# Patient Record
Sex: FEMALE | Race: WHITE | NOT HISPANIC OR LATINO | Employment: PART TIME | URBAN - METROPOLITAN AREA
[De-identification: names, ages, dates, MRNs, and addresses within clinical notes are randomized per-mention and may not be internally consistent; named-entity substitution may affect disease eponyms.]

---
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
## 2021-01-29 ENCOUNTER — ANNUAL EXAM (OUTPATIENT)
Dept: OBGYN CLINIC | Facility: CLINIC | Age: 21
End: 2021-01-29
Payer: COMMERCIAL

## 2021-01-29 VITALS
WEIGHT: 140.2 LBS | HEART RATE: 41 BPM | BODY MASS INDEX: 21.25 KG/M2 | DIASTOLIC BLOOD PRESSURE: 60 MMHG | SYSTOLIC BLOOD PRESSURE: 100 MMHG | HEIGHT: 68 IN | TEMPERATURE: 97.6 F

## 2021-01-29 DIAGNOSIS — Z01.419 ENCOUNTER FOR GYNECOLOGICAL EXAMINATION (GENERAL) (ROUTINE) WITHOUT ABNORMAL FINDINGS: Primary | ICD-10-CM

## 2021-01-29 DIAGNOSIS — N89.8 VAGINAL DISCHARGE: ICD-10-CM

## 2021-01-29 DIAGNOSIS — Z11.3 SCREENING EXAMINATION FOR STD (SEXUALLY TRANSMITTED DISEASE): ICD-10-CM

## 2021-01-29 LAB
BV WHIFF TEST VAG QL: NORMAL
CLUE CELLS SPEC QL WET PREP: NORMAL
PH SMN: 4 [PH]
T VAGINALIS VAG QL WET PREP: NORMAL
YEAST VAG QL WET PREP: NORMAL

## 2021-01-29 PROCEDURE — 87491 CHLMYD TRACH DNA AMP PROBE: CPT | Performed by: OBSTETRICS & GYNECOLOGY

## 2021-01-29 PROCEDURE — 87591 N.GONORRHOEAE DNA AMP PROB: CPT | Performed by: OBSTETRICS & GYNECOLOGY

## 2021-01-29 PROCEDURE — 87210 SMEAR WET MOUNT SALINE/INK: CPT | Performed by: OBSTETRICS & GYNECOLOGY

## 2021-01-29 PROCEDURE — 99395 PREV VISIT EST AGE 18-39: CPT | Performed by: OBSTETRICS & GYNECOLOGY

## 2021-01-29 NOTE — PATIENT INSTRUCTIONS
Breast Self Exam for Women   WHAT YOU NEED TO KNOW:   A BSE is a way to check your breasts for lumps and other changes  Regular BSEs can help you know how your breasts normally look and feel  Most breast lumps or changes are not cancer, but you should always have them checked by a healthcare provider  Your healthcare provider can also watch you do a BSE and can tell you if you are doing your BSE correctly  DISCHARGE INSTRUCTIONS:   Call your doctor if:   · You find any lumps or changes in your breasts  · You have breast pain or fluid coming from your nipples  · You have questions or concerns about your condition or care  Why you should do a BSE:  Breast cancer is the most common type of cancer in women  Even if you have mammograms, you may still want to do a BSE regularly  If you know how your breasts normally feel and look, it may help you know when to contact your healthcare provider  Mammograms can miss some cancers  You may find a lump during a BSE that did not show up on a mammogram   When you should do a BSE:  If you have periods, you may want to do your BSE 1 week after your period ends  This is the time when your breasts may be the least swollen, lumpy, or tender  You can do regular BSEs even if you are breastfeeding or have breast implants  How to do a BSE:       · Look at your breasts in a mirror  Look at the size and shape of each breast and nipple  Check for swelling, lumps, dimpling, scaly skin, or other skin changes  Look for nipple changes, such as a nipple that is painful or beginning to pull inward  Gently squeeze both nipples and check to see if fluid (that is not breast milk) comes out of them  If you find any of these or other breast changes, contact your healthcare provider  Check your breasts while you sit or  the following 3 positions:    ? Hang your arms down at your sides  ? Raise your hands and join them behind your head      ? Put firm pressure with your hands on your hips  Bend slightly forward while you look at your breasts in the mirror  · Lie down and feel your breasts  When you lie down, your breast tissue spreads out evenly over your chest  This makes it easier for you to feel for lumps and anything that may not be normal for your breasts  Do a BSE on one breast at a time  ? Place a small pillow or towel under your left shoulder  Put your left arm behind your head  ? Use the 3 middle fingers of your right hand  Use your fingertip pads, on the top of your fingers  Your fingertip pad is the most sensitive part of your finger  ? Use small circles to feel your breast tissue  Use your fingertip pads to make dime-sized, overlapping circles on your breast and armpits  Use light, medium, and firm pressure  First, press lightly  Second, press with medium pressure to feel a little deeper into the breast  Last, use firm pressure to feel deep within your breast     ? Examine your entire breast area  Examine the breast area from above the breast to below the breast where you feel only ribs  Make small circles with your fingertips, starting in the middle of your armpit  Make circles going up and down the breast area  Continue toward your breast and all the way across it  Examine the area from your armpit all the way over to the middle of your chest (breastbone)  Stop at the middle of your chest     ? Move the pillow or towel to your right shoulder, and put your right arm behind your head  Use the 3 fingertip pads of your left hand, and repeat the above steps to do a BSE on your right breast   What else you can do to check for breast problems or cancer:  Talk to your healthcare provider about mammograms  A mammogram is an x-ray of your breasts to screen for breast cancer or other problems  Your provider can tell you the benefits and risks of mammograms  The first mammogram is usually at age 39 or 48   Your provider may recommend you start at 36 or younger if your risk for breast cancer is high  Mammograms usually continue every 1 to 2 years until age 76  Follow up with your doctor as directed:  Write down your questions so you remember to ask them during your visits  © Copyright [address] Information is for End User's use only and may not be sold, redistributed or otherwise used for commercial purposes  All illustrations and images included in CareNotes® are the copyrighted property of A D A M , Inc  or [facility] Viviana Tran   The above information is an  only  It is not intended as medical advice for individual conditions or treatments  Talk to your doctor, nurse or pharmacist before following any medical regimen to see if it is safe and effective for you

## 2021-01-29 NOTE — PROGRESS NOTES
Assessment        Diagnoses and all orders for this visit:    Encounter for gynecological examination (general) (routine) without abnormal findings    Screening examination for STD (sexually transmitted disease)  -     Chlamydia/GC amplified DNA by PCR  -     Hepatitis B surface antigen; Future  -     Hepatitis C antibody; Future  -     Herpes I/II IgG Antibodies; Future  -     RPR; Future  -     HIV 1/2 Antigen/Antibody (4th Generation) w Reflex [facility]; Future    Vaginal discharge  -     POCT wet mount                  Plan      All questions answered  Blood tests: STD testing  Breast self exam technique reviewed and patient encouraged to perform self-exam monthly  Chlamydia specimen  Contraception: coitus interruptus  Discussed healthy lifestyle modifications  Educational material distributed  Follow up in 1 year  Follow up as needed  GC specimen  Wet prep  Wet prep is negative for infection  Await gonorrhea and chlamydia testing  Advised laboratory testing for STD screening we will call patient with results  She declines contraception  Follow-up in year  PAP due at age 25    [address] [place] [address] Estela is a 22 y o  female who presents for annual exam       Chief Complaint   Patient presents with    Gynecologic Exam     Yearly  Patient reports no concerns  She has been with same partner x 3 years  She has increased discharge, no itching or odor        Last Pap: n/a    HPV vaccine completed:yes - 3 doses  Current contraception: coitus interruptus  History of abnormal Pap smear: no  History of abnormal mammogram: no  Family history of uterine or ovarian cancer: no  Family history of breast cancer: no  Family history of colon cancer: no        Menstrual History:  OB History        0    Para   0    Term   0       0    AB   0    Living   0       SAB   0    TAB   0    Ectopic   0    Multiple   0    Live Births   0                Menarche age: 16  Patient's last menstrual period was 01/04/2021  Period Cycle (Days): 28  Period Duration (Days): 3-4  Period Pattern: Regular  Menstrual Flow: Light  Menstrual Control: Tampon, Thin pad      History reviewed  No pertinent past medical history  Past Surgical History:   Procedure Laterality Date    MOUTH SURGERY      release of upper lip frenulum     Family History   Problem Relation Age of Onset    Hypertension Mother     Mental illness Father     Bipolar disorder Father     Depression Father     Diabetes Father     Kidney cancer Maternal Grandmother     Stroke Maternal Grandmother     Cancer Maternal Grandfather     Cancer Paternal Grandmother     No Known Problems Paternal Grandfather     Hypothyroidism Half-Sister     No Known Problems Half-Sister        Social History     Tobacco Use    Smoking status: Never Smoker    Smokeless tobacco: Never Used   Substance Use Topics    Alcohol use: No    Drug use: No          Current Outpatient Medications:     multivitamin (THERAGRAN) TABS, Take 1 tablet by mouth daily, Disp: , Rfl:     No Known Allergies        Review of Systems   Constitutional: Negative  HENT: Negative  Eyes: Negative  Respiratory: Negative  Cardiovascular: Negative  Gastrointestinal: Negative  Endocrine: Negative  Genitourinary:        As noted in HPI   Musculoskeletal: Negative  Skin: Negative  Allergic/Immunologic: Negative  Neurological: Negative  Hematological: Negative  Psychiatric/Behavioral: Negative  /60 (BP Location: Right arm, Patient Position: Sitting, Cuff Size: Adult)   Pulse (!) 41   Temp 97 6 °F (36 4 °C) (Temporal)   Ht 5' 8" (1 727 m)   Wt 63 6 kg (140 lb 3 2 oz)   LMP 01/04/2021   BMI 21 32 kg/m²         Physical Exam  Constitutional:       Appearance: She is well-developed  Genitourinary:      Pelvic exam was performed with patient in the lithotomy position        Vulva, urethra, bladder, vagina, uterus and rectum normal       No vulval condylomata, lesion, tenderness, Bartholin's cyst or rash noted  No signs of labial injury  No posterior fourchette tenderness, injury, rash or lesion present  No inguinal adenopathy present in the right or left side  No lesions in the vagina  No vaginal discharge, tenderness, bleeding, atrophy or prolapse  No cervical motion tenderness, friability, lesion or polyp  Uterus is not enlarged or tender  No right or left adnexal mass present  Right adnexa not tender or full  Left adnexa not tender or full  Genitourinary Comments:      Rectum:      No external hemorrhoid  HENT:      Head: Normocephalic  Nose: Nose normal    Eyes:      Conjunctiva/sclera: Conjunctivae normal    Neck:      Musculoskeletal: Neck supple  No muscular tenderness  Thyroid: No thyromegaly  Cardiovascular:      Rate and Rhythm: Normal rate and regular rhythm  Heart sounds: Normal heart sounds  No murmur  Pulmonary:      Effort: Pulmonary effort is normal  No respiratory distress  Breath sounds: Normal breath sounds  No wheezing or rales  Chest:      Breasts:         Right: No mass, nipple discharge, skin change or tenderness  Left: No mass, nipple discharge, skin change or tenderness  Abdominal:      General: There is no distension  Palpations: Abdomen is soft  There is no mass  Tenderness: There is no abdominal tenderness  There is no guarding or rebound  Musculoskeletal:         General: No tenderness  Lymphadenopathy:      Cervical: No cervical adenopathy  Lower Body: No right inguinal adenopathy  No left inguinal adenopathy  Neurological:      Mental Status: She is alert and oriented to person, place, and time  Skin:     General: Skin is warm and dry     Psychiatric:         Mood and Affect: Mood normal          Behavior: Behavior normal

## 2021-01-30 LAB
C TRACH DNA SPEC QL NAA+PROBE: NEGATIVE
N GONORRHOEA DNA SPEC QL NAA+PROBE: NEGATIVE

## 2021-07-15 ENCOUNTER — OFFICE VISIT (OUTPATIENT)
Dept: URGENT CARE | Facility: CLINIC | Age: 21
End: 2021-07-15
Payer: COMMERCIAL

## 2021-07-15 VITALS
DIASTOLIC BLOOD PRESSURE: 52 MMHG | SYSTOLIC BLOOD PRESSURE: 108 MMHG | HEART RATE: 67 BPM | OXYGEN SATURATION: 98 % | RESPIRATION RATE: 18 BRPM | TEMPERATURE: 98.7 F

## 2021-07-15 DIAGNOSIS — J06.9 ACUTE RESPIRATORY DISEASE: Primary | ICD-10-CM

## 2021-07-15 PROCEDURE — 99203 OFFICE O/P NEW LOW 30 MIN: CPT | Performed by: PHYSICIAN ASSISTANT

## 2021-07-15 PROCEDURE — U0003 INFECTIOUS AGENT DETECTION BY NUCLEIC ACID (DNA OR RNA); SEVERE ACUTE RESPIRATORY SYNDROME CORONAVIRUS 2 (SARS-COV-2) (CORONAVIRUS DISEASE [COVID-19]), AMPLIFIED PROBE TECHNIQUE, MAKING USE OF HIGH THROUGHPUT TECHNOLOGIES AS DESCRIBED BY CMS-2020-01-R: HCPCS | Performed by: PHYSICIAN ASSISTANT

## 2021-07-15 PROCEDURE — U0005 INFEC AGEN DETEC AMPLI PROBE: HCPCS | Performed by: PHYSICIAN ASSISTANT

## 2021-07-15 NOTE — PATIENT INSTRUCTIONS
Flonase over the counter  Vitamin D3 2000 IU daily  Vitamin C 1g every 12 hours  Multivitamin daily  Fluids and rest  Nasal saline spray; Afrin if severe congestion (do not use for more than 3 days)  Over the counter decongestant  Tylenol/Ibuprofen for pain/fever  Salt water gargles and chloraseptic spray  Warm compresses over sinuses  Steam treatment (utilize proper safety precautions when in contact with hot water/steam)  Follow up with PCP in 3-5 days  Proceed to  ER if symptoms worsen  Cold Symptoms   WHAT YOU NEED TO KNOW:   A cold is an infection caused by a virus  The infection causes your upper respiratory system to become inflamed  Common symptoms of a cold include sneezing, dry throat, a stuffy nose, headache, watery eyes, and a cough  Your cough may be dry, or you may cough up mucus  You may also have muscle aches, joint pain, and tiredness  Rarely, you may have a fever  Most colds go away without treatment  DISCHARGE INSTRUCTIONS:   Return to the emergency department if:   · You have increased tiredness and weakness  · You are unable to eat  · Your heart is beating much faster than usual for you  · You see white spots in the back of your throat and your neck is swollen and sore to the touch  · You see pinpoint or larger reddish-purple dots on your skin  Contact your healthcare provider if:   · You have a fever higher than 102°F (38 9°C)  · You have new or worsening shortness of breath  · You have thick nasal drainage for more than 2 days  · Your symptoms do not improve or get worse within 5 days  · You have questions or concerns about your condition or care  Medicines: The following medicines may be suggested by your healthcare provider to decrease your cold symptoms  These medicines are available without a doctor's order  Ask which medicines to take and when to take them  Follow directions    · NSAIDs or acetaminophen  help to bring down a fever or decrease pain     · Decongestants  help decrease nasal stuffiness  · Antihistamines  help decrease sneezing and a runny nose  · Cough suppressants  help decrease how much you cough  · Expectorants  help loosen mucus so you can cough it up  · Take your medicine as directed  Contact your healthcare provider if you think your medicine is not helping or if you have side effects  Tell him of her if you are allergic to any medicine  Keep a list of the medicines, vitamins, and herbs you take  Include the amounts, and when and why you take them  Bring the list or the pill bottles to follow-up visits  Carry your medicine list with you in case of an emergency  Symptom relief: The following may help relieve cold symptoms, such as a dry throat and congestion:  · Gargle with mouthwash or warm salt water as directed  · Suck on throat lozenges or hard candy  · Use a cold or warm vaporizer or humidifier to ease your breathing  · Rest for at least 2 days and then as needed to decrease tiredness and weakness  · Use petroleum based jelly around your nostrils to decrease irritation from blowing your nose  Drink liquids:  Liquids will help thin and loosen thick mucus so you can cough it up  Liquids will also keep you hydrated  Ask your healthcare provider which liquids are best for you and how much to drink each day  Prevent the spread of germs: You can spread your cold germs to others for at least 3 days after your symptoms start  Wash your hands often  Do not share items, such as eating utensils  Cover your nose and mouth when you cough or sneeze using the crook of your elbow instead of your hands  Throw used tissues in the garbage  Do not smoke:  Smoking may worsen your symptoms and increase the length of time you feel sick  Talk with your healthcare provider if you need help to stop smoking    Follow up with your healthcare provider as directed:  Write down your questions so you remember to ask them during your visits  © Copyright [address] Information is for End User's use only and may not be sold, redistributed or otherwise used for commercial purposes  All illustrations and images included in CareNotes® are the copyrighted property of A D A M [employer]  or Jackelin Johnson  The above information is an  only  It is not intended as medical advice for individual conditions or treatments  Talk to your doctor, nurse or pharmacist before following any medical regimen to see if it is safe and effective for you  [address]    Your healthcare provider and/or public health staff have evaluated you and have determined that you do not need to remain in the hospital at this time  At this time you can be isolated at home where you will be monitored by staff from your local or state health department  You should carefully follow the prevention and isolation steps below until a healthcare provider or local or state health department says that you can return to your normal activities  Stay home except to get medical care    People who are mildly ill with COVID-19 are able to isolate at home during their illness  You should restrict activities outside your home, except for getting medical care  Do not go to work, school, or public areas  Avoid using public transportation, ride-sharing, or taxis  Separate yourself from other people and animals in your home    People: As much as possible, you should stay in a specific room and away from other people in your home  Also, you should use a separate bathroom, if available  Animals: You should restrict contact with pets and other animals while you are sick with COVID-19, just like you would around other people   Although there have not been reports of pets or other animals becoming sick with COVID-19, it is still recommended that people sick with COVID-19 limit contact with animals until more information is known about the virus  When possible, have another member of your household care for your animals while you are sick  If you are sick with COVID-19, avoid contact with your pet, including petting, snuggling, being kissed or licked, and sharing food  If you must care for your pet or be around animals while you are sick, wash your hands before and after you interact with pets and wear a facemask  See COVID-19 and Animals for more information  Call ahead before visiting your doctor    If you have a medical appointment, call the healthcare provider and tell them that you have or may have COVID-19  This will help the healthcare providers office take steps to keep other people from getting infected or exposed  Wear a facemask    You should wear a facemask when you are around other people (e g , sharing a room or vehicle) or pets and before you enter a healthcare providers office  If you are not able to wear a facemask (for example, because it causes trouble breathing), then people who live with you should not stay in the same room with you, or they should wear a facemask if they enter your room  Cover your coughs and sneezes    Cover your mouth and nose with a tissue when you cough or sneeze  Throw used tissues in a lined trash can  Immediately wash your hands with soap and water for at least 20 seconds or, if soap and water are not available, clean your hands with an alcohol-based hand  that contains at least 60% alcohol  Clean your hands often    Wash your hands often with soap and water for at least 20 seconds, especially after blowing your nose, coughing, or sneezing; going to the bathroom; and before eating or preparing food  If soap and water are not readily available, use an alcohol-based hand  with at least 60% alcohol, covering all surfaces of your hands and rubbing them together until they feel dry  Soap and water are the best option if hands are visibly dirty   Avoid touching your eyes, nose, and mouth with unwashed hands  Avoid sharing personal household items    You should not share dishes, drinking glasses, cups, eating utensils, towels, or bedding with other people or pets in your home  After using these items, they should be washed thoroughly with soap and water  Clean all high-touch surfaces everyday    High touch surfaces include counters, tabletops, doorknobs, bathroom fixtures, toilets, phones, keyboards, tablets, and bedside tables  Also, clean any surfaces that may have blood, stool, or body fluids on them  Use a household cleaning spray or wipe, according to the label instructions  Labels contain instructions for safe and effective use of the cleaning product including precautions you should take when applying the product, such as wearing gloves and making sure you have good ventilation during use of the product  Monitor your symptoms    Seek prompt medical attention if your illness is worsening (e g , difficulty breathing)  Before seeking care, call your healthcare provider and tell them that you have, or are being evaluated for, COVID-19  Put on a facemask before you enter the facility  These steps will help the healthcare providers office to keep other people in the office or waiting room from getting infected or exposed  Ask your healthcare provider to call the local or [facility] health department  Persons who are placed under active monitoring or facilitated self-monitoring should follow instructions provided by their local health department or occupational health professionals, as appropriate  If you have a medical emergency and need to call 911, notify the dispatch personnel that you have, or are being evaluated for COVID-19  If possible, put on a facemask before emergency medical services arrive      Discontinuing home isolation    Patients with confirmed COVID-19 should remain under home isolation precautions until the following conditions are met:   - They have had no fever for at least 24 hours (that is one full day of no fever without the use medicine that reduces fevers)  AND  - other symptoms have improved (for example, when their cough or shortness of breath have improved)  AND  - If had mild or moderate illness, at least 10 days have passed since their symptoms first appeared or if severe illness (needed oxygen) or immunosuppressed, at least 20 days have passed since symptoms first appeared  Patients with confirmed COVID-19 should also notify close contacts (including their workplace) and ask that they self-quarantine  Currently, close contact is defined as being within 6 feet for 15 minutes or more from the period 24 hours starting 48 hours before symptom onset to the time at which the patient went into isolation  Close contacts of patients diagnosed with COVID-19 should be instructed by the patient to self-quarantine for 14 days from the last time of their last contact with the patient       Source: [employer]

## 2021-07-15 NOTE — LETTER
July 15, 2021     Patient: Lorrane Channel   YOB: 2000   Date of Visit: 7/15/2021       To Whom It May Concern: It is my medical opinion that Lioneluna Hosea should remain out of work until cleared by a healthcare provider  If you have any questions or concerns, please don't hesitate to call           Sincerely,        Adriana Colon PA-C    CC: Brendon Davalos

## 2021-07-15 NOTE — PROGRESS NOTES
[facility]        NAME: Brendon Davalos is a 22 y o  female  : 2000    MRN: [id number]  DATE: July 15, 2021  TIME: 12:06 PM    Assessment and Plan   Acute respiratory disease [J06 9]  1  Acute respiratory disease  Novel Coronavirus (Covid-19),PCR [facility] - Office Collection       Declined Bromfed DM    Patient Instructions     Flonase over the counter  Vitamin D3 2000 IU daily  Vitamin C 1g every 12 hours  Multivitamin daily  Fluids and rest  Nasal saline spray; Afrin if severe congestion (do not use for more than 3 days)  Over the counter decongestant  Tylenol/Ibuprofen for pain/fever  Salt water gargles and chloraseptic spray  Warm compresses over sinuses  Steam treatment (utilize proper safety precautions when in contact with hot water/steam)  Follow up with PCP in 3-5 days  Proceed to  ER if symptoms worsen  Chief Complaint     Chief Complaint   Patient presents with    Sinus Problem     Sinus congestion and nausea since Monday  History of Present Illness       Patient has not had the COVID vaccines  States a coworker was tested for COVID today, she has similar symptoms  URI   This is a new problem  Episode onset: 3d  There has been no fever  Associated symptoms include congestion, coughing, diarrhea, headaches, nausea, rhinorrhea, sneezing and a sore throat (in the morning)  Pertinent negatives include no abdominal pain, chest pain, ear pain, rash, sinus pain or vomiting  She has tried nothing for the symptoms  Review of Systems   Review of Systems   Constitutional: Negative for activity change, appetite change, chills, fatigue and fever  HENT: Positive for congestion, postnasal drip, rhinorrhea, sinus pressure, sneezing and sore throat (in the morning)  Negative for dental problem, ear discharge, ear pain, facial swelling, sinus pain and trouble swallowing  Eyes: Negative for itching  Respiratory: Positive for cough  Negative for shortness of breath  Cardiovascular: Negative for chest pain and palpitations  Gastrointestinal: Positive for diarrhea and nausea  Negative for abdominal pain, constipation and vomiting  Musculoskeletal: Negative for myalgias  Skin: Negative for rash  Neurological: Positive for headaches  Negative for dizziness, weakness and light-headedness  Current Medications       Current Outpatient Medications:     multivitamin (THERAGRAN) TABS, Take 1 tablet by mouth daily, Disp: , Rfl:     Current Allergies     Allergies as of 07/15/2021    (No Known Allergies)            The following portions of the patient's history were reviewed and updated as appropriate: allergies, current medications, past family history, past medical history, past social history, past surgical history and problem list      No past medical history on file  Past Surgical History:   Procedure Laterality Date    MOUTH SURGERY      release of upper lip frenulum       Family History   Problem Relation Age of Onset    Hypertension Mother     Mental illness Father     Bipolar disorder Father     Depression Father     Diabetes Father     Kidney cancer Maternal Grandmother     Stroke Maternal Grandmother     Cancer Maternal Grandfather     Cancer Paternal Grandmother     No Known Problems Paternal Grandfather     Hypothyroidism Half-Sister     No Known Problems Half-Sister          Medications have been verified  Objective   /52   Pulse 67   Temp 98 7 °F (37 1 °C)   Resp 18   SpO2 98%   No LMP recorded  Physical Exam     Physical Exam  Vitals reviewed  Constitutional:       General: She is not in acute distress  Appearance: She is well-developed  HENT:      Head: Normocephalic and atraumatic        Comments: B/L frontal sinus TTP     Right Ear: Tympanic membrane and external ear normal       Left Ear: Tympanic membrane and external ear normal       Mouth/Throat:      Pharynx: No oropharyngeal exudate or posterior oropharyngeal erythema  Eyes:      General:         Right eye: No discharge  Left eye: No discharge  Conjunctiva/sclera: Conjunctivae normal    Cardiovascular:      Rate and Rhythm: Normal rate and regular rhythm  Heart sounds: Normal heart sounds  No murmur heard  No friction rub  No gallop  Pulmonary:      Effort: Pulmonary effort is normal  No respiratory distress  Breath sounds: Normal breath sounds  No wheezing or rales  Chest:      Chest wall: No tenderness  Lymphadenopathy:      Cervical: No cervical adenopathy  Skin:     General: Skin is warm  Findings: No rash  Neurological:      Mental Status: She is alert and oriented to person, place, and time  Psychiatric:         Behavior: Behavior normal          Thought Content:  Thought content normal          Judgment: Judgment normal

## 2021-07-16 ENCOUNTER — OFFICE VISIT (OUTPATIENT)
Dept: URGENT CARE | Facility: CLINIC | Age: 21
End: 2021-07-16
Payer: COMMERCIAL

## 2021-07-16 VITALS
WEIGHT: 137 LBS | BODY MASS INDEX: 20.76 KG/M2 | HEIGHT: 68 IN | SYSTOLIC BLOOD PRESSURE: 112 MMHG | DIASTOLIC BLOOD PRESSURE: 48 MMHG | OXYGEN SATURATION: 98 % | HEART RATE: 72 BPM | TEMPERATURE: 97.9 F | RESPIRATION RATE: 18 BRPM

## 2021-07-16 DIAGNOSIS — J01.00 ACUTE MAXILLARY SINUSITIS, RECURRENCE NOT SPECIFIED: Primary | ICD-10-CM

## 2021-07-16 LAB — SARS-COV-2 RNA RESP QL NAA+PROBE: NEGATIVE

## 2021-07-16 PROCEDURE — 99213 OFFICE O/P EST LOW 20 MIN: CPT | Performed by: PHYSICIAN ASSISTANT

## 2021-07-16 RX ORDER — BROMPHENIRAMINE MALEATE, PSEUDOEPHEDRINE HYDROCHLORIDE, AND DEXTROMETHORPHAN HYDROBROMIDE 2; 30; 10 MG/5ML; MG/5ML; MG/5ML
5 SYRUP ORAL 4 TIMES DAILY PRN
Qty: 120 ML | Refills: 0 | Status: SHIPPED | OUTPATIENT
Start: 2021-07-16

## 2021-07-16 RX ORDER — FLUTICASONE PROPIONATE 50 MCG
1 SPRAY, SUSPENSION (ML) NASAL DAILY
Qty: 11.1 ML | Refills: 0 | Status: SHIPPED | OUTPATIENT
Start: 2021-07-16

## 2021-07-16 NOTE — PROGRESS NOTES
[facility]          NAME: Jesus Roy is a 22 y o  female  : 2000    MRN: [id number]  DATE: 2021  TIME: 1:39 PM    Assessment and Plan   Acute maxillary sinusitis, recurrence not specified [J01 00]  1  Acute maxillary sinusitis, recurrence not specified  fluticasone (FLONASE) 50 mcg/act nasal spray    brompheniramine-pseudoephedrine-DM 30-2-10 MG/5ML syrup       Patient Instructions   Infection appears viral   Recommend symptomatic treatment  Can take ibuprofen or tylenol as needed for pain or fever  Over the counter cough and cold medications to help with symptoms  Use salt water gargles for sore throat and throat lozenges  Cough drops as needed  Wash hands frequently to prevent the spread of infection  If not improving over the next 7-10 days, follow up with PCP  Symptoms may persist for 10-14 days  To present to the ER if symptoms worsen  Chief Complaint     Chief Complaint   Patient presents with    Cold Like Symptoms     c/o sinus congestion x 3 days Had covid test yesterday and was negative         History of Present Illness   Jovan Centeno presents to the clinic c/o    Here yesterday, covid negative, ongoing symptoms  Sinusitis  This is a new problem  The current episode started in the past 7 days  The problem is unchanged  There has been no fever  The pain is mild  Associated symptoms include congestion, coughing, sinus pressure and a sore throat  Pertinent negatives include no chills, diaphoresis, ear pain, headaches, shortness of breath or sneezing  Past treatments include nothing  The treatment provided no relief  Review of Systems   Review of Systems   Constitutional: Negative for chills, diaphoresis, fatigue and fever  HENT: Positive for congestion, postnasal drip, sinus pressure and sore throat  Negative for ear discharge, ear pain, facial swelling, sinus pain and sneezing      Eyes: Negative for photophobia, pain, discharge, redness, itching and visual disturbance  Respiratory: Positive for cough  Negative for apnea, chest tightness, shortness of breath and wheezing  Cardiovascular: Negative for chest pain and palpitations  Gastrointestinal: Negative for abdominal pain  Skin: Negative for color change, rash and wound  Neurological: Negative for dizziness and headaches  Hematological: Negative for adenopathy  Current Medications     Long-Term Medications   Medication Sig Dispense Refill    multivitamin (THERAGRAN) TABS Take 1 tablet by mouth daily      fluticasone (FLONASE) 50 mcg/act nasal spray 1 spray into each nostril daily 11 1 mL 0       Current Allergies     Allergies as of 07/16/2021    (No Known Allergies)            The following portions of the patient's history were reviewed and updated as appropriate: allergies, current medications, past family history, past medical history, past social history, past surgical history and problem list   History reviewed  No pertinent past medical history    Past Surgical History:   Procedure Laterality Date    MOUTH SURGERY      release of upper lip frenulum     Social History     Socioeconomic History    Marital status: Single     Spouse name: Not on file    Number of children: Not on file    Years of education: Not on file    Highest education level: Not on file   Occupational History    Not on file   Tobacco Use    Smoking status: Never Smoker    Smokeless tobacco: Never Used   Vaping Use    Vaping Use: Never used   Substance and Sexual Activity    Alcohol use: No    Drug use: No    Sexual activity: Yes     Partners: Male     Birth control/protection: None   Other Topics Concern    Not on file   Social History Narrative    Not on file     Social Determinants of Health     Financial Resource Strain:     Difficulty of Paying Living Expenses:    Food Insecurity:     Worried About Running Out of Food in the Last Year:     [address] in the Last Year: Transportation Needs:     Lack of Transportation (Medical):  Lack of Transportation (Non-Medical):    Physical Activity:     Days of Exercise per Week:     Minutes of Exercise per Session:    Stress:     Feeling of Stress :    Social Connections:     Frequency of Communication with Friends and Family:     Frequency of Social Gatherings with Friends and Family:     Attends [employer] Services:     Active Member of Clubs or Organizations:     Attends Club or Organization Meetings:     Marital Status:    Intimate Partner Violence:     Fear of Current or Ex-Partner:     Emotionally Abused:     Physically Abused:     Sexually Abused:        Objective   BP (!) 112/48   Pulse 72   Temp 97 9 °F (36 6 °C)   Resp 18   Ht 5' 8" (1 727 m)   Wt 62 1 kg (137 lb)   LMP 06/23/2021   SpO2 98%   BMI 20 83 kg/m²      Physical Exam     Physical Exam  Vitals and nursing note reviewed  Constitutional:       General: She is not in acute distress  Appearance: She is well-developed  She is not diaphoretic  HENT:      Head: Normocephalic and atraumatic  Right Ear: Tympanic membrane and external ear normal       Left Ear: Tympanic membrane and external ear normal       Nose: Congestion present  Right Sinus: Maxillary sinus tenderness present  Left Sinus: Maxillary sinus tenderness present  Mouth/Throat:      Mouth: Mucous membranes are moist       Pharynx: Oropharynx is clear  No oropharyngeal exudate or posterior oropharyngeal erythema  Tonsils: No tonsillar exudate  Eyes:      General: No scleral icterus  Right eye: No discharge  Left eye: No discharge  Conjunctiva/sclera: Conjunctivae normal    Cardiovascular:      Rate and Rhythm: Normal rate and regular rhythm  Heart sounds: Normal heart sounds  No murmur heard  No friction rub  No gallop  Pulmonary:      Effort: Pulmonary effort is normal  No respiratory distress        Breath sounds: Normal breath sounds  No decreased breath sounds, wheezing, rhonchi or rales  Skin:     General: Skin is warm and dry  Coloration: Skin is not pale  Findings: No erythema or rash  Neurological:      Mental Status: She is alert and oriented to person, place, and time  Psychiatric:         Behavior: Behavior normal          Thought Content:  Thought content normal          Judgment: Judgment normal          Maritza Pederson PA-C

## 2021-07-16 NOTE — LETTER
July 16, 2021     Patient: Jeison Batista   YOB: 2000   Date of Visit: 7/16/2021       To Whom it May Concern:    Chavo Cole is under my professional care  She was seen in my office on 7/16/2021  If you have any questions or concerns, please don't hesitate to call           Sincerely,          Severo Mckeon PA-C        CC: No Recipients

## 2021-07-23 ENCOUNTER — OFFICE VISIT (OUTPATIENT)
Dept: PEDIATRICS CLINIC | Facility: CLINIC | Age: 21
End: 2021-07-23
Payer: COMMERCIAL

## 2021-07-23 VITALS
WEIGHT: 139 LBS | SYSTOLIC BLOOD PRESSURE: 110 MMHG | OXYGEN SATURATION: 99 % | HEART RATE: 82 BPM | BODY MASS INDEX: 21.13 KG/M2 | RESPIRATION RATE: 18 BRPM | DIASTOLIC BLOOD PRESSURE: 58 MMHG | TEMPERATURE: 98.7 F

## 2021-07-23 DIAGNOSIS — J06.9 VIRAL UPPER RESPIRATORY TRACT INFECTION: Primary | ICD-10-CM

## 2021-07-23 PROCEDURE — 99213 OFFICE O/P EST LOW 20 MIN: CPT | Performed by: PEDIATRICS

## 2021-07-23 PROCEDURE — 1036F TOBACCO NON-USER: CPT | Performed by: PEDIATRICS

## 2021-07-23 NOTE — PATIENT INSTRUCTIONS
Cold Symptoms, Ambulatory Care   Smiley PIMENTEL: Viruses  In: Diseases: A Nursing Process Approach to Excellent Care, 4th ed  [address], [place], 2006, pp [phone]  Per GUZMAN & Margarita Valerio G: Upper Respiratory Infection  In: Osmin FJ, ed  The 5-Minute Clinical Consult 2012, 20th ed  [address] [facility] [place], [place], 2012, [place] [address]  © 2014 [address] is for End User's use only and may not be sold, redistributed or otherwise used for commercial purposes  All illustrations and images included in CareNotes® are the copyrighted property of [employer] , [employer]  or Nirmal Ballard  The above information is an  only  It is not intended as medical advice for individual conditions or treatments  Talk to your doctor, nurse or pharmacist before following any medical regimen to see if it is safe and effective for you

## 2021-07-23 NOTE — LETTER
July 23, 2021     Patient: Beth Chandler   YOB: 2000   Date of Visit: 7/23/2021       To Whom it May Concern:    Alejandra Guadalupe is under my professional care  She was seen in my office on 7/23/2021  She may return to work on 7/26/2021  If you have any questions or concerns, please don't hesitate to call           Sincerely,          Daryl Lema MD        CC: No Recipients

## 2021-07-23 NOTE — PROGRESS NOTES
MA Note:   Patient is here  For cough and congestion    Vitals:    07/23/21 1523   BP: 110/58   Pulse: 82   Resp: 18   Temp: 98 7 °F (37 1 °C)   SpO2: 99%       Assessment/Plan:  Mira Martinez was seen today for uri  Diagnoses and all orders for this visit:    Viral upper respiratory tract infection        Patient ID: Viki Roberts is a 22 y o  female    HPI:   The  Patient was seen in urgent care for upper respiratory infection twice  She presented with cough and congestion  Her COVID-19 test was negative  She is now feeling better and she needs a note to return to  Work    the patient reports that she still has some cough, but no fever,  No malaise, no problems breathing      Review of Systems:  Review of Systems   Constitutional: Negative  Negative for chills, fatigue, fever and unexpected weight change  HENT: Negative  Negative for ear pain, hearing loss, nosebleeds, sore throat and voice change  Eyes: Negative  Negative for pain, discharge and itching  Respiratory: Positive for cough  Negative for choking, shortness of breath and wheezing  Cardiovascular: Negative  Negative for chest pain and palpitations  Gastrointestinal: Negative  Negative for abdominal pain, blood in stool, constipation, diarrhea, nausea and vomiting  Endocrine: Negative  Negative for cold intolerance and heat intolerance  Genitourinary: Negative  Negative for dysuria, pelvic pain, vaginal bleeding and vaginal discharge  Musculoskeletal: Negative  Negative for joint swelling, myalgias and neck stiffness  Skin: Negative  Negative for rash  Neurological: Negative  Negative for dizziness, weakness, light-headedness, numbness and headaches  Hematological: Negative  Psychiatric/Behavioral: Negative  Negative for behavioral problems, confusion and sleep disturbance  The patient is not nervous/anxious  All other systems reviewed and are negative        Physical Exam:  Physical Exam  Vitals and nursing note reviewed  Constitutional:       General: She is not in acute distress  Appearance: She is well-developed  HENT:      Head: Normocephalic  Right Ear: External ear normal       Left Ear: External ear normal       Nose: Nose normal       Mouth/Throat:      Mouth: Mucous membranes are moist       Pharynx: No oropharyngeal exudate or posterior oropharyngeal erythema  Comments: Some cloudy postnasal drip noted  Eyes:      General: No scleral icterus  Right eye: No discharge  Left eye: No discharge  Conjunctiva/sclera: Conjunctivae normal       Pupils: Pupils are equal, round, and reactive to light  Cardiovascular:      Rate and Rhythm: Normal rate  Heart sounds: Normal heart sounds, S1 normal and S2 normal  No murmur heard  Pulmonary:      Effort: Pulmonary effort is normal       Breath sounds: Normal breath sounds  Abdominal:      General: Bowel sounds are normal       Palpations: Abdomen is soft  There is no hepatomegaly or splenomegaly  Tenderness: There is no abdominal tenderness  Genitourinary:     Comments: Bob #  Musculoskeletal:         General: Normal range of motion  Cervical back: Normal range of motion and neck supple  Skin:     General: Skin is warm  Findings: No rash  Comments: Capillary Refill less than 3 seconds   Neurological:      Mental Status: She is alert  Cranial Nerves: No cranial nerve deficit  Deep Tendon Reflexes: Reflexes are normal and symmetric  Psychiatric:         Behavior: Behavior normal          Thought Content: Thought content normal          Judgment: Judgment normal          Follow Up: Return if symptoms worsen or fail to improve, for Recheck      Visit Discussion:   Continue monitor the condition, call the office if spikes fever, the cough is not getting better, new  Symptoms     May return to work,  Note given    Patient Instructions   Cold Symptoms, Ambulatory Care   Smiley Dempsey: Viruses  In: Diseases: A Nursing Process Approach to Excellent Care, 4th ed  [address], [place], 2006, pp [phone]  Per GUZMAN & Blanquita Mary G: Upper Respiratory Infection  In: Osmin FOX, ed  The 5-Minute Clinical Consult 2012, 20th ed  [address] [facility],[address], [address], [place], 2012, [place] 2129-1990 © 2014 [address] is for End User's use only and may not be sold, redistributed or otherwise used for commercial purposes  All illustrations and images included in CareNotes® are the copyrighted property of A D A M , Inc  or Nirmal Balalrd  The above information is an  only  It is not intended as medical advice for individual conditions or treatments  Talk to your doctor, nurse or pharmacist before following any medical regimen to see if it is safe and effective for you

## 2022-01-25 ENCOUNTER — TELEPHONE (OUTPATIENT)
Dept: PEDIATRICS CLINIC | Facility: CLINIC | Age: 22
End: 2022-01-25

## 2022-05-06 NOTE — TELEPHONE ENCOUNTER
05/06/22 2:37 PM     Thank you for your request  Your request has been received, reviewed, and the patient chart updated  The PCP has successfully been removed with a patient attribution note  This message will now be completed      Thank you  Deni Andino

## 2022-06-13 ENCOUNTER — OFFICE VISIT (OUTPATIENT)
Dept: URGENT CARE | Facility: CLINIC | Age: 22
End: 2022-06-13
Payer: COMMERCIAL

## 2022-06-13 VITALS
SYSTOLIC BLOOD PRESSURE: 115 MMHG | RESPIRATION RATE: 18 BRPM | TEMPERATURE: 98.6 F | OXYGEN SATURATION: 100 % | BODY MASS INDEX: 21.22 KG/M2 | HEART RATE: 54 BPM | DIASTOLIC BLOOD PRESSURE: 54 MMHG | HEIGHT: 68 IN | WEIGHT: 140 LBS

## 2022-06-13 DIAGNOSIS — L25.5 RHUS DERMATITIS: Primary | ICD-10-CM

## 2022-06-13 PROCEDURE — 99213 OFFICE O/P EST LOW 20 MIN: CPT

## 2022-06-13 RX ORDER — TRIAMCINOLONE ACETONIDE 1 MG/G
CREAM TOPICAL 2 TIMES DAILY
Qty: 30 G | Refills: 0 | Status: SHIPPED | OUTPATIENT
Start: 2022-06-13

## 2022-06-13 RX ORDER — PREDNISONE 10 MG/1
TABLET ORAL
Qty: 26 TABLET | Refills: 0 | Status: SHIPPED | OUTPATIENT
Start: 2022-06-13 | End: 2022-06-21

## 2022-06-13 NOTE — PROGRESS NOTES
[facility]        NAME: Rosa Maria Keller is a 25 y o  female  : 2000    MRN: [id number]  DATE: 2022  TIME: 5:11 PM    Assessment and Plan   Rhus dermatitis [L25 5]  1  Rhus dermatitis  predniSONE 10 mg tablet    triamcinolone (KENALOG) 0 1 % cream         Patient Instructions     Patient Instructions   Take steroids as prescribed  May apply steroid cream to legs and hands, do not apply on face  Avoid scratching and causing a secondary skin infection  If you develop any worsening symptoms as discussed, return or follow up with your PCP  Follow up with PCP in 3-5 days  Proceed to  ER if symptoms worsen  Chief Complaint     Chief Complaint   Patient presents with    Rash     X 2 weeks all over body , itchy          History of Present Illness     Rosa Maria Keller is a 25 y o  female who presents today for evaluation of an itchy rash on her bilateral inner thighs, right hand, and on her face and right eye  Patient reports this started approximately 2 weeks ago after she was weeding in the yard and came in contact with poison ivy  She has been trying over-the-counter poison ivy creams without relief  She denies any change in vision, facial swelling, spreading redness, or fevers  HPI    Review of Systems   Review of Systems   Constitutional: Negative for chills and fever  HENT: Negative for facial swelling and sore throat  Eyes: Positive for itching  Negative for pain, redness and visual disturbance  Respiratory: Negative for cough and shortness of breath  Cardiovascular: Negative for chest pain and palpitations  Gastrointestinal: Negative for abdominal pain and vomiting  Musculoskeletal: Negative for arthralgias and myalgias  Skin: Positive for rash  Negative for color change           Current Medications       Current Outpatient Medications:     predniSONE 10 mg tablet, Take 3 tablets (30 mg total) by mouth 2 (two) times a day with meals for 2 days, THEN 2 tablets (20 mg total) 2 (two) times a day with meals for 2 days, THEN 1 tablet (10 mg total) 2 (two) times a day with meals for 2 days, THEN 1 tablet (10 mg total) daily for 2 days  , Disp: 26 tablet, Rfl: 0    triamcinolone (KENALOG) 0 1 % cream, Apply topically 2 (two) times a day, Disp: 30 g, Rfl: 0    brompheniramine-pseudoephedrine-DM 30-2-10 MG/5ML syrup, Take 5 mL by mouth 4 (four) times a day as needed for congestion, cough or allergies (Patient not taking: Reported on 6/13/2022), Disp: 120 mL, Rfl: 0    fluticasone (FLONASE) 50 mcg/act nasal spray, 1 spray into each nostril daily (Patient not taking: Reported on 6/13/2022), Disp: 11 1 mL, Rfl: 0    multivitamin (THERAGRAN) TABS, Take 1 tablet by mouth daily (Patient not taking: Reported on 6/13/2022), Disp: , Rfl:     Current Allergies     Allergies as of 06/13/2022    (No Known Allergies)            The following portions of the patient's history were reviewed and updated as appropriate: allergies, current medications, past family history, past medical history, past social history, past surgical history and problem list      History reviewed  No pertinent past medical history  Past Surgical History:   Procedure Laterality Date    MOUTH SURGERY      release of upper lip frenulum       Family History   Problem Relation Age of Onset    Hypertension Mother     Mental illness Father     Bipolar disorder Father     Depression Father     Diabetes Father     Kidney cancer Maternal Grandmother     Stroke Maternal Grandmother     Cancer Maternal Grandfather     Cancer Paternal Grandmother     No Known Problems Paternal Grandfather     Hypothyroidism Half-Sister     No Known Problems Half-Sister          Medications have been verified          Objective   /54   Pulse (!) 54   Temp 98 6 °F (37 °C)   Resp 18   Ht 5' 8" (1 727 m)   Wt 63 5 kg (140 lb)   SpO2 100%   BMI 21 29 kg/m²        Physical Exam     Physical Exam  Vitals and nursing note reviewed  Constitutional:       General: She is not in acute distress  Appearance: Normal appearance  HENT:      Head: Normocephalic and atraumatic  Mouth/Throat:      Mouth: Mucous membranes are moist    Eyes:     Cardiovascular:      Rate and Rhythm: Normal rate and regular rhythm  Heart sounds: Normal heart sounds  Pulmonary:      Effort: Pulmonary effort is normal       Breath sounds: Normal breath sounds  No wheezing, rhonchi or rales  Lymphadenopathy:      Cervical: No cervical adenopathy  Skin:     General: Skin is warm and dry  Findings: Rash present  Rash is papular and vesicular  Comments: Papular vesicular rash in clusters on b/l inner thighs and right index finger  No surrounding erythema or swelling  Psychiatric:         Behavior: Behavior normal  Behavior is cooperative

## 2022-06-13 NOTE — PATIENT INSTRUCTIONS
Take steroids as prescribed  May apply steroid cream to legs and hands, do not apply on face  Avoid scratching and causing a secondary skin infection  If you develop any worsening symptoms as discussed, return or follow up with your PCP

## 2023-07-10 ENCOUNTER — OFFICE VISIT (OUTPATIENT)
Dept: URGENT CARE | Facility: CLINIC | Age: 23
End: 2023-07-10
Payer: COMMERCIAL

## 2023-07-10 DIAGNOSIS — Z02.1 PHYSICAL EXAM, PRE-EMPLOYMENT: Primary | ICD-10-CM

## 2023-07-10 NOTE — PROGRESS NOTES
[facility]        NAME: Adonay Morrow is a 26 y.o. female  : 2000    MRN: [id number]  DATE: July 10, 2023  TIME: 10:35 AM    Assessment and Plan   Physical exam, pre-employment [Z02.1]  1. Physical exam, pre-employment              Patient Instructions       Follow up with PCP in 3-5 days. Proceed to  ER if symptoms worsen. Forms completed and copies were given to registration to scan into Epic chart          Chief Complaint   No chief complaint on file. 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 Current Medications       Current Outpatient Medications:   •  brompheniramine-pseudoephedrine-DM 30-2-10 MG/5ML syrup, Take 5 mL by mouth 4 (four) times a day as needed for congestion, cough or allergies (Patient not taking: Reported on 2022), Disp: 120 mL, Rfl: 0  •  fluticasone (FLONASE) 50 mcg/act nasal spray, 1 spray into each nostril daily (Patient not taking: Reported on 2022), Disp: 11.1 mL, Rfl: 0  •  multivitamin (THERAGRAN) TABS, Take 1 tablet by mouth daily (Patient not taking: Reported on 2022), Disp: , Rfl:   •  triamcinolone (KENALOG) 0.1 % cream, Apply topically 2 (two) times a day, Disp: 30 g, Rfl: 0    Current Allergies     Allergies as of 07/10/2023   • (No Known Allergies)            The following portions of the patient's history were reviewed and updated as appropriate: allergies, current medications, past family history, past medical history, past social history, past surgical history and problem list.     History reviewed. No pertinent past medical history.     Past Surgical History:   Procedure Laterality Date • MOUTH SURGERY      release of upper lip frenulum       Family History   Problem Relation Age of Onset   • Hypertension Mother    • Mental illness Father    • Bipolar disorder Father    • Depression Father    • Diabetes Father    • Kidney cancer Maternal Grandmother    • Stroke Maternal Grandmother    • Cancer Maternal Grandfather    • Cancer Paternal Grandmother    • No Known Problems Paternal Grandfather    • Hypothyroidism Half-Sister    • No Known Problems Half-Sister          Medications have been verified. Objective   There were no vitals taken for this visit. No LMP recorded. Physical Exam     Physical Exam  Vitals and nursing note reviewed. Constitutional:       General: She is not in acute distress. Appearance: Normal appearance. She is normal weight. She is not ill-appearing, toxic-appearing or diaphoretic. HENT:      Head: Normocephalic and atraumatic. Right Ear: Tympanic membrane and ear canal normal.      Left Ear: Tympanic membrane and ear canal normal.      Nose: Nose normal. No congestion or rhinorrhea. Mouth/Throat:      Mouth: Mucous membranes are moist.      Pharynx: Oropharynx is clear. No oropharyngeal exudate or posterior oropharyngeal erythema. Eyes:      General: No scleral icterus. Right eye: No discharge. Left eye: No discharge. Extraocular Movements: Extraocular movements intact. Pupils: Pupils are equal, round, and reactive to light. Cardiovascular:      Rate and Rhythm: Normal rate and regular rhythm. Pulses: Normal pulses. Heart sounds: Normal heart sounds. Pulmonary:      Effort: Pulmonary effort is normal.      Breath sounds: Normal breath sounds. Abdominal:      General: There is no distension. Palpations: Abdomen is soft. Tenderness: There is no abdominal tenderness. Musculoskeletal:         General: Normal range of motion. Cervical back: Normal range of motion and neck supple.    Skin: General: Skin is warm and dry. Capillary Refill: Capillary refill takes less than 2 seconds. Neurological:      General: No focal deficit present. Mental Status: She is alert and oriented to person, place, and time. GCS: GCS eye subscore is 4. GCS verbal subscore is 5. GCS motor subscore is 6. Cranial Nerves: Cranial nerves 2-12 are intact. Sensory: Sensation is intact. Motor: Motor function is intact. Coordination: Coordination is intact. Gait: Gait is intact. Psychiatric:         Mood and Affect: Mood normal.         Behavior: Behavior normal.         Thought Content:  Thought content normal.         Judgment: Judgment normal.

## 2023-08-06 ENCOUNTER — OFFICE VISIT (OUTPATIENT)
Dept: URGENT CARE | Facility: CLINIC | Age: 23
End: 2023-08-06

## 2023-08-06 VITALS
TEMPERATURE: 98.6 F | OXYGEN SATURATION: 100 % | SYSTOLIC BLOOD PRESSURE: 119 MMHG | RESPIRATION RATE: 18 BRPM | HEART RATE: 66 BPM | DIASTOLIC BLOOD PRESSURE: 56 MMHG

## 2023-08-06 DIAGNOSIS — L08.9 INFECTED BLISTER OF GREAT TOE OF RIGHT FOOT, INITIAL ENCOUNTER: Primary | ICD-10-CM

## 2023-08-06 DIAGNOSIS — S90.421A INFECTED BLISTER OF GREAT TOE OF RIGHT FOOT, INITIAL ENCOUNTER: Primary | ICD-10-CM

## 2023-08-06 PROCEDURE — 99213 OFFICE O/P EST LOW 20 MIN: CPT | Performed by: NURSE PRACTITIONER

## 2023-08-06 RX ORDER — AMOXICILLIN AND CLAVULANATE POTASSIUM 875; 125 MG/1; MG/1
1 TABLET, FILM COATED ORAL EVERY 12 HOURS SCHEDULED
Qty: 14 TABLET | Refills: 0 | Status: SHIPPED | OUTPATIENT
Start: 2023-08-06 | End: 2023-08-06 | Stop reason: ALTCHOICE

## 2023-08-06 RX ORDER — SULFAMETHOXAZOLE AND TRIMETHOPRIM 800; 160 MG/1; MG/1
1 TABLET ORAL EVERY 12 HOURS SCHEDULED
Qty: 14 TABLET | Refills: 0 | Status: SHIPPED | OUTPATIENT
Start: 2023-08-06 | End: 2023-08-13

## 2023-08-06 NOTE — PATIENT INSTRUCTIONS
After 24 hours, symptoms should not get worse. It may take a few days to start to see improvement. If symptoms are worsening after 24 hours or if you are not seeing improvement, you should be seen again--you may need something that is broader spectrum such as the Augmentin, but hopefully you will respond to the bactrim. Wound Infection   AMBULATORY CARE:   A wound infection  occurs when bacteria enters a break in the skin. The infection may involve just the skin, or affect deeper tissues or organs close to the wound. Signs and symptoms of a wound infection:  Your symptoms may start a few days after you get the wound, or may not occur for a month or two after the wound happens:  Fever    Warm, red, painful, or swollen skin near the wound    Blood or pus coming from the wound     A foul odor coming from the wound    Seek care immediately if:   You feel short of breath. Your heart is beating faster than usual.     You feel confused. Blood soaks through your bandages. Your wound comes apart or feels like it is ripping. You have severe pain. You see red streaks coming from the infected area. Contact your healthcare provider if:   You have a fever or chills. You have more pain, redness, or swelling near your wound. Your symptoms do not improve. The skin around your wound feels numb. You have questions or concerns about your condition or care. Treatment for a wound infection  will depend on how severe the wound is, its location, and whether other areas are affected. It may also depend on your health and the length of time you have had the wound. Ask your provider about these and other treatments you may need:  Medicine  will be given to treat the infection and decrease pain and swelling.      Wound care  may be done to clean your wound and help it heal. A wound vacuum may also be placed over your wound to help it heal.     Hyperbaric oxygen therapy  (HBO) may be used to get more oxygen to your tissues to help them heal. The pressurized oxygen is given as you sit in a pressure chamber. Surgery  may be needed to clean the wound or remove infected or dead tissue. Surgery may also be needed to remove a foreign object. Care for your wound as directed:  Keep your wound clean and dry. You may need to cover your wound when you bathe so it does not get wet. Clean your wound as directed with soap and water or wound . Put on new, clean bandages as directed. Change your bandages when they get wet or dirty. Help your wound heal:   Eat a variety of healthy foods. Examples include fruits, vegetables, whole-grain breads, low-fat dairy products, beans, lean meats, and fish. Healthy foods may help you heal faster. You may also need to take vitamins and minerals. Ask if you need to be on a special diet. Manage other health conditions. Follow your provider's directions to manage health conditions that can cause slow wound healing. Examples include high blood pressure and diabetes. Do not smoke. Nicotine and other chemicals in cigarettes and cigars can cause slow wound healing. Ask your provider for information if you currently smoke and need help to quit. E-cigarettes or smokeless tobacco still contain nicotine. Talk to your provider before you use these products. Follow up with your healthcare provider in 1 to 2 days:  Write down your questions so you remember to ask them during your visits. © Copyright Ravi Mirella 2022 Information is for End User's use only and may not be sold, redistributed or otherwise used for commercial purposes. The above information is an  only. It is not intended as medical advice for individual conditions or treatments. Talk to your doctor, nurse or pharmacist before following any medical regimen to see if it is safe and effective for you. Blister   AMBULATORY CARE:   A blister  is a fluid-filled pocket on the surface of your skin.  The fluid may be serum, blood, or other fluid, depending on what caused the blister. A layer of fluid is created to protect the skin until it heals. Blisters usually heal on their own within 2 weeks. Seek care immediately if:   You see signs of infection, such as red streaks, pus, or swelling. Call your doctor or dermatologist if:   You have increased pain, redness, and swelling in the blister area. You notice a bad-smelling fluid coming from your blister. The blister does not heal within 2 weeks, or when your healthcare provider recommended. You have a fever, chills, or body aches. You have questions or concerns about your condition or care. Care for your blister:  Do not pop your blister or tear the skin on it. This could cause infection and slow the healing process. The following will help protect your blister area: Wash your hands before you care for your blister. This will help prevent infection. Use soap and water, or a gel-based hand  if soap and water are not available. Cover your blister with a bandage, if needed. A bandage can help prevent the blister from being torn or popped. If the blister does break open, a bandage can will keep the area clean prevent infection. Use a bandage that is large enough to cover the entire blister. This will prevent the bandage from sticking to the blister. Your healthcare provider may tell you to use certain moist bandages or hydrogels. You may need a bandage that absorbs well if your blister has a lot of fluid. You may be told to wear a second bandage for extra protection. Carefully remove your bandage to care for the area. If the bandage sticks to your blister, pour saline on it. This will help the bandage come off more easily and prevent more skin damage. Apply clean bandages as directed. Change your bandages when they get wet, dirty, or soaked with fluid. Keep the blister area clean and dry.   Wash the area with soap or saline and water. Gently pat the area dry. Look for signs of infection, such as redness, swelling, or pus. Prevent another blister:   Apply a thick skin cream or ointment. This can help prevent friction. Your healthcare provider may recommend a certain product. Choose clothes that do not bind, rub, or irritate your skin. Some fabrics can prevent blisters by wicking moisture away from your skin. Choose fabric that allows air to flow around your skin. Light clothing that fits loosely prevents friction as you move. Wear work gloves when you use tools such as a rake or a hammer. Protect your feet. Keep your feet clean and dry during the day. Wear shoes that fit well. Shoes that rub your feet may cause or worsen blisters. Cushioned insoles, padding, or moleskin can keep your feet from rubbing in your shoes. You can also wear 2 pairs of socks to give your feet more protection. Use padding to protect pressure areas, such as your heels. Gauze, moleskin, or similar padding can prevent or relieve pressure. You can also use padding to protect an area that has a blister. Make a hole in the middle of the padding. Place the padding so the blister goes through the hole. Cover the entire area with a bandage. Stop your activity if a red area forms. Red or painful skin during activity may be a sign that a blister could form. You may need to stop and add padding or a bandage to the area. You may need to change clothing or your shoes. Follow up with your doctor or dermatologist as directed: You may need to return to have your blister drained if it is large. Write down your questions so you remember to ask them during your visits. © Copyright Pb Sanchez 2022 Information is for End User's use only and may not be sold, redistributed or otherwise used for commercial purposes. The above information is an  only. It is not intended as medical advice for individual conditions or treatments.  Talk to your doctor, nurse or pharmacist before following any medical regimen to see if it is safe and effective for you.

## 2023-08-06 NOTE — PROGRESS NOTES
[facility]        NAME: Marjan Singh is a 26 y.o. female  : 2000    MRN: [id number]  DATE: 2023  TIME: 5:15 PM      Assessment and Plan     Infected blister of great toe of right foot, initial encounter [S90.421A, L08.9]  1. Infected blister of great toe of right foot, initial encounter  sulfamethoxazole-trimethoprim (BACTRIM DS) 800-160 mg per tablet    DISCONTINUED: amoxicillin-clavulanate (AUGMENTIN) 875-125 mg per tablet            Patient Instructions     Patient Instructions     After 24 hours, symptoms should not get worse. It may take a few days to start to see improvement. If symptoms are worsening after 24 hours or if you are not seeing improvement, you should be seen again--you may need something that is broader spectrum such as the Augmentin, but hopefully you will respond to the bactrim. Wound Infection   AMBULATORY CARE:   A wound infection  occurs when bacteria enters a break in the skin. The infection may involve just the skin, or affect deeper tissues or organs close to the wound. Signs and symptoms of a wound infection:  Your symptoms may start a few days after you get the wound, or may not occur for a month or two after the wound happens:  • Fever    • Warm, red, painful, or swollen skin near the wound    • Blood or pus coming from the wound     • A foul odor coming from the wound    Seek care immediately if:   • You feel short of breath. • Your heart is beating faster than usual.     • You feel confused. • Blood soaks through your bandages. • Your wound comes apart or feels like it is ripping. • You have severe pain. • You see red streaks coming from the infected area. Contact your healthcare provider if:   • You have a fever or chills. • You have more pain, redness, or swelling near your wound. • Your symptoms do not improve. • The skin around your wound feels numb.     • You have questions or concerns about your condition or care.    Treatment for a wound infection  will depend on how severe the wound is, its location, and whether other areas are affected. It may also depend on your health and the length of time you have had the wound. Ask your provider about these and other treatments you may need:  • Medicine  will be given to treat the infection and decrease pain and swelling. • Wound care  may be done to clean your wound and help it heal. A wound vacuum may also be placed over your wound to help it heal.     • Hyperbaric oxygen therapy  (HBO) may be used to get more oxygen to your tissues to help them heal. The pressurized oxygen is given as you sit in a pressure chamber. • Surgery  may be needed to clean the wound or remove infected or dead tissue. Surgery may also be needed to remove a foreign object. Care for your wound as directed:  Keep your wound clean and dry. You may need to cover your wound when you bathe so it does not get wet. Clean your wound as directed with soap and water or wound . Put on new, clean bandages as directed. Change your bandages when they get wet or dirty. Help your wound heal:   • Eat a variety of healthy foods. Examples include fruits, vegetables, whole-grain breads, low-fat dairy products, beans, lean meats, and fish. Healthy foods may help you heal faster. You may also need to take vitamins and minerals. Ask if you need to be on a special diet. • Manage other health conditions. Follow your provider's directions to manage health conditions that can cause slow wound healing. Examples include high blood pressure and diabetes. • Do not smoke. Nicotine and other chemicals in cigarettes and cigars can cause slow wound healing. Ask your provider for information if you currently smoke and need help to quit. E-cigarettes or smokeless tobacco still contain nicotine. Talk to your provider before you use these products.     Follow up with your healthcare provider in 1 to 2 days:  Write down your questions so you remember to ask them during your visits. © Copyright Ty Renteria 2022 Information is for End User's use only and may not be sold, redistributed or otherwise used for commercial purposes. The above information is an  only. It is not intended as medical advice for individual conditions or treatments. Talk to your doctor, nurse or pharmacist before following any medical regimen to see if it is safe and effective for you. Blister   AMBULATORY CARE:   A blister  is a fluid-filled pocket on the surface of your skin. The fluid may be serum, blood, or other fluid, depending on what caused the blister. A layer of fluid is created to protect the skin until it heals. Blisters usually heal on their own within 2 weeks. Seek care immediately if:   • You see signs of infection, such as red streaks, pus, or swelling. Call your doctor or dermatologist if:   • You have increased pain, redness, and swelling in the blister area. • You notice a bad-smelling fluid coming from your blister. • The blister does not heal within 2 weeks, or when your healthcare provider recommended. • You have a fever, chills, or body aches. • You have questions or concerns about your condition or care. Care for your blister:  Do not pop your blister or tear the skin on it. This could cause infection and slow the healing process. The following will help protect your blister area:  • Wash your hands before you care for your blister. This will help prevent infection. Use soap and water, or a gel-based hand  if soap and water are not available. • Cover your blister with a bandage, if needed. A bandage can help prevent the blister from being torn or popped. If the blister does break open, a bandage can will keep the area clean prevent infection. ? Use a bandage that is large enough to cover the entire blister. This will prevent the bandage from sticking to the blister.  Your healthcare provider may tell you to use certain moist bandages or hydrogels. ? You may need a bandage that absorbs well if your blister has a lot of fluid. You may be told to wear a second bandage for extra protection. ? Carefully remove your bandage to care for the area. If the bandage sticks to your blister, pour saline on it. This will help the bandage come off more easily and prevent more skin damage. ? Apply clean bandages as directed. Change your bandages when they get wet, dirty, or soaked with fluid. • Keep the blister area clean and dry. Wash the area with soap or saline and water. Gently pat the area dry. Look for signs of infection, such as redness, swelling, or pus. Prevent another blister:   • Apply a thick skin cream or ointment. This can help prevent friction. Your healthcare provider may recommend a certain product. • Choose clothes that do not bind, rub, or irritate your skin. Some fabrics can prevent blisters by wicking moisture away from your skin. Choose fabric that allows air to flow around your skin. Light clothing that fits loosely prevents friction as you move. Wear work gloves when you use tools such as a rake or a hammer. • Protect your feet. Keep your feet clean and dry during the day. Wear shoes that fit well. Shoes that rub your feet may cause or worsen blisters. Cushioned insoles, padding, or moleskin can keep your feet from rubbing in your shoes. You can also wear 2 pairs of socks to give your feet more protection. • Use padding to protect pressure areas, such as your heels. Gauze, moleskin, or similar padding can prevent or relieve pressure. You can also use padding to protect an area that has a blister. Make a hole in the middle of the padding. Place the padding so the blister goes through the hole. Cover the entire area with a bandage. • Stop your activity if a red area forms.   Red or painful skin during activity may be a sign that a blister could form. You may need to stop and add padding or a bandage to the area. You may need to change clothing or your shoes. Follow up with your doctor or dermatologist as directed: You may need to return to have your blister drained if it is large. Write down your questions so you remember to ask them during your visits. © Copyright Xuan Kapoor 2022 Information is for End User's use only and may not be sold, redistributed or otherwise used for commercial purposes. The above information is an  only. It is not intended as medical advice for individual conditions or treatments. Talk to your doctor, nurse or pharmacist before following any medical regimen to see if it is safe and effective for you. Follow up with PCP in 3-5 days. Proceed to  ER if symptoms worsen. Chief Complaint     Chief Complaint   Patient presents with   • right great toenail infection     blister from shoes, but went in ocean, applied neosporin and band aid, feels stiff and inflamed when woke up, felt chills and over body aches, felt feverish, but went away          History of Present Illness     Had a pre-existing blister over the PIPJ of the right great toe and then went to the [facility] where it was broken/rubbed open in the ocean, on the sand, etc.  In the last 24 hours, the redness, swelling, drainage at the site along with stiffness have gotten worse. Subjective fever along with some body aches. Notes hx being very sensitive to medications and states she prefers to avoid Augmentin due to higher rate of GI intolerance. Discussed its wide range of bacterial coverage--aerobic and anaerobic. Prefers starting with something a little less potent and will return if symptoms do not improve. Review of Systems     Review of Systems   Constitutional: Positive for fever. Musculoskeletal: Positive for myalgias. Skin: Positive for color change and wound.    All other systems reviewed and are negative. Current Medications       Current Outpatient Medications:   •  sulfamethoxazole-trimethoprim (BACTRIM DS) 800-160 mg per tablet, Take 1 tablet by mouth every 12 (twelve) hours for 7 days, Disp: 14 tablet, Rfl: 0    Current Allergies     Allergies as of 08/06/2023   • (No Known Allergies)              The following portions of the patient's history were reviewed and updated as appropriate: allergies, current medications, past family history, past medical history, past social history, past surgical history and problem list.     No past medical history on file. Past Surgical History:   Procedure Laterality Date   • MOUTH SURGERY      release of upper lip frenulum       Family History   Problem Relation Age of Onset   • Hypertension Mother    • Mental illness Father    • Bipolar disorder Father    • Depression Father    • Diabetes Father    • Kidney cancer Maternal Grandmother    • Stroke Maternal Grandmother    • Cancer Maternal Grandfather    • Cancer Paternal Grandmother    • No Known Problems Paternal Grandfather    • Hypothyroidism Half-Sister    • No Known Problems Half-Sister          Medications have been verified. Objective     /56   Pulse 66   Temp 98.6 °F (37 °C)   Resp 18   SpO2 100%   No LMP recorded. Physical Exam     Physical Exam  Vitals and nursing note reviewed. Constitutional:       General: She is not in acute distress. Appearance: Normal appearance. She is well-developed. She is not ill-appearing, toxic-appearing or diaphoretic. HENT:      Head: Normocephalic and atraumatic. Eyes:      Pupils: Pupils are equal, round, and reactive to light. Pulmonary:      Effort: Pulmonary effort is normal. No respiratory distress. Abdominal:      General: There is no distension. Palpations: Abdomen is soft. Musculoskeletal:         General: Normal range of motion. Cervical back: Normal range of motion and neck supple.         Feet:    Feet: Right foot:      Skin integrity: Blister and erythema present. Skin:     General: Skin is warm and dry. Capillary Refill: Capillary refill takes less than 2 seconds. Neurological:      General: No focal deficit present. Mental Status: She is alert and oriented to person, place, and time. Psychiatric:         Mood and Affect: Mood normal.         Behavior: Behavior normal.         Thought Content:  Thought content normal.         Judgment: Judgment normal.

## 2023-12-26 ENCOUNTER — OFFICE VISIT (OUTPATIENT)
Dept: URGENT CARE | Facility: CLINIC | Age: 23
End: 2023-12-26
Payer: COMMERCIAL

## 2023-12-26 VITALS
SYSTOLIC BLOOD PRESSURE: 99 MMHG | RESPIRATION RATE: 18 BRPM | TEMPERATURE: 97.9 F | BODY MASS INDEX: 20.46 KG/M2 | HEART RATE: 53 BPM | HEIGHT: 68 IN | OXYGEN SATURATION: 100 % | WEIGHT: 135 LBS | DIASTOLIC BLOOD PRESSURE: 55 MMHG

## 2023-12-26 DIAGNOSIS — H61.21 IMPACTED CERUMEN OF RIGHT EAR: ICD-10-CM

## 2023-12-26 DIAGNOSIS — H92.01 OTALGIA OF RIGHT EAR: Primary | ICD-10-CM

## 2023-12-26 PROCEDURE — 99213 OFFICE O/P EST LOW 20 MIN: CPT | Performed by: PHYSICIAN ASSISTANT

## 2023-12-26 NOTE — PROGRESS NOTES
[facility]        NAME: Romi Centeno is a 23 y.o. female  : 2000    MRN: [id number]  DATE: 2023  TIME: 5:50 PM    Assessment and Plan   Otalgia of right ear [H92.01]  1. Otalgia of right ear        2. Impacted cerumen of right ear  carbamide peroxide (DEBROX) 6.5 % otic solution        Use drops for 3-4 consecutive days then return to clinic or follow up with PCP/ENT for cerumen removal.    Patient Instructions     Use drops for 3-4 consecutive days then return to clinic or follow up with PCP/ENT for cerumen   Proceed to  ER if symptoms worsen.  Use drops as directed.    Chief Complaint     Chief Complaint   Patient presents with    Ear Fullness     Patient c/o right ear being blocked.  Patient reports this started a week ago.          History of Present Illness       Patient is a 23 year old female [facility] with fullness in right ear.  Pt reports fullness began about 1 week ago.+Pressure sensation.  Pt was recently ill and feels as if there is fluid buildup.  Pt did attempt ear wax removed drops without relief, worsened symptoms. No recent fever.    Ear Fullness   There is pain in the right ear. This is a new problem. The current episode started in the past 7 days. The problem occurs constantly. The problem has been unchanged. There has been no fever. Associated symptoms include hearing loss. Pertinent negatives include no abdominal pain, coughing, rash, sore throat or vomiting.       Review of Systems   Review of Systems   Constitutional:  Negative for chills and fever.   HENT:  Positive for ear pain and hearing loss. Negative for sore throat.    Eyes:  Negative for pain and visual disturbance.   Respiratory:  Negative for cough and shortness of breath.    Cardiovascular:  Negative for chest pain and palpitations.   Gastrointestinal:  Negative for abdominal pain and vomiting.   Genitourinary:  Negative for dysuria and hematuria.   Musculoskeletal:  Negative for  "arthralgias and back pain.   Skin:  Negative for color change and rash.   Neurological:  Negative for seizures and syncope.   All other systems reviewed and are negative.        Current Medications       Current Outpatient Medications:     carbamide peroxide (DEBROX) 6.5 % otic solution, Administer 5 drops to the right ear 2 (two) times a day for 4 days, Disp: 2 mL, Rfl: 0    Current Allergies     Allergies as of 12/26/2023    (No Known Allergies)            The following portions of the patient's history were reviewed and updated as appropriate: allergies, current medications, past family history, past medical history, past social history, past surgical history and problem list.     History reviewed. No pertinent past medical history.    Past Surgical History:   Procedure Laterality Date    MOUTH SURGERY      release of upper lip frenulum       Family History   Problem Relation Age of Onset    Hypertension Mother     Mental illness Father     Bipolar disorder Father     Depression Father     Diabetes Father     Kidney cancer Maternal Grandmother     Stroke Maternal Grandmother     Cancer Maternal Grandfather     Cancer Paternal Grandmother     No Known Problems Paternal Grandfather     Hypothyroidism Half-Sister     No Known Problems Half-Sister          Medications have been verified.        Objective   BP 99/55   Pulse (!) 53   Temp 97.9 °F (36.6 °C) (Temporal)   Resp 18   Ht 5' 8\" (1.727 m)   Wt 61.2 kg (135 lb)   LMP 12/11/2023 (Exact Date)   SpO2 100%   BMI 20.53 kg/m²   Patient's last menstrual period was 12/11/2023 (exact date).       Physical Exam     Physical Exam  Constitutional:       Appearance: Normal appearance.   HENT:      Head: Normocephalic and atraumatic.      Right Ear: There is impacted cerumen.      Nose: Nose normal.      Mouth/Throat:      Mouth: Mucous membranes are moist.   Eyes:      Extraocular Movements: Extraocular movements intact.      Conjunctiva/sclera: Conjunctivae normal. "      Pupils: Pupils are equal, round, and reactive to light.   Cardiovascular:      Rate and Rhythm: Normal rate.   Pulmonary:      Effort: Pulmonary effort is normal.   Musculoskeletal:         General: Normal range of motion.      Cervical back: Normal range of motion and neck supple.   Skin:     General: Skin is warm and dry.      Capillary Refill: Capillary refill takes less than 2 seconds.   Neurological:      General: No focal deficit present.      Mental Status: She is alert and oriented to person, place, and time.   Psychiatric:         Mood and Affect: Mood normal.         Behavior: Behavior normal.

## 2023-12-26 NOTE — PATIENT INSTRUCTIONS
Use drops for 3-4 consecutive days then return to clinic or follow up with PCP/ENT for cerumen removal.

## 2023-12-29 ENCOUNTER — OFFICE VISIT (OUTPATIENT)
Dept: URGENT CARE | Facility: CLINIC | Age: 23
End: 2023-12-29
Payer: COMMERCIAL

## 2023-12-29 VITALS
DIASTOLIC BLOOD PRESSURE: 61 MMHG | TEMPERATURE: 98.6 F | OXYGEN SATURATION: 95 % | SYSTOLIC BLOOD PRESSURE: 90 MMHG | RESPIRATION RATE: 18 BRPM | HEART RATE: 53 BPM

## 2023-12-29 DIAGNOSIS — H92.01 OTALGIA OF RIGHT EAR: ICD-10-CM

## 2023-12-29 DIAGNOSIS — H61.21 IMPACTED CERUMEN OF RIGHT EAR: Primary | ICD-10-CM

## 2023-12-29 PROCEDURE — 69210 REMOVE IMPACTED EAR WAX UNI: CPT | Performed by: PHYSICIAN ASSISTANT

## 2023-12-29 PROCEDURE — 99213 OFFICE O/P EST LOW 20 MIN: CPT | Performed by: PHYSICIAN ASSISTANT

## 2023-12-29 PROCEDURE — 69209 REMOVE IMPACTED EAR WAX UNI: CPT | Performed by: PHYSICIAN ASSISTANT

## 2023-12-29 RX ORDER — CIPROFLOXACIN AND DEXAMETHASONE 3; 1 MG/ML; MG/ML
4 SUSPENSION/ DROPS AURICULAR (OTIC) 2 TIMES DAILY
Qty: 7.5 ML | Refills: 0 | Status: SHIPPED | OUTPATIENT
Start: 2023-12-29

## 2023-12-29 RX ORDER — METHYLPREDNISOLONE 4 MG/1
TABLET ORAL
Qty: 21 TABLET | Refills: 0 | Status: SHIPPED | OUTPATIENT
Start: 2023-12-29

## 2023-12-29 NOTE — PROGRESS NOTES
[facility]        NAME: Romi Centeno is a 23 y.o. female  : 2000    MRN: [id number]  DATE: 2023  TIME: 10:36 AM    Assessment and Plan   Impacted cerumen of right ear [H61.21]  1. Impacted cerumen of right ear        2. Otalgia of right ear  ciprofloxacin-dexamethasone (CIPRODEX) otic suspension    methylPREDNISolone 4 MG tablet therapy pack            Patient Instructions     Patient Instructions   Cerumen removed from ear.  Canal red and irritated and will treat with 5-day course of Ciprodex.  Will also treat congestion with Medrol Dosepak.  Discussed if symptoms do not improve or resolve finishing course of medication to follow-up with ENT.      Follow up with PCP in 3-5 days.  Proceed to  ER if symptoms worsen.    Chief Complaint     Chief Complaint   Patient presents with    Ear Problem     C/o right ear clogged since last Tuesday          History of Present Illness       Patient is a 23-year-old female presenting today with right ear pain/clogged ear x 1 week.  Patient was seen and evaluated here a couple days ago, diagnosed with a cerumen impaction, was treated with Debrox drops which she has been using and attempted at home flushing of the ear but notes no change or improvement of her symptoms.  Notes she has had problems with this ear many times over the last few years, has been seen and evaluated by an ENT for this problem.  Notes she has had some decreased hearing and discomfort of the ear.  Denies ear discharge or drainage, hearing loss, tinnitus, lightheadedness, nausea, vomiting.        Review of Systems   Review of Systems   Constitutional:  Negative for chills and fever.   HENT:  Positive for ear pain. Negative for sore throat.    Eyes:  Negative for pain and visual disturbance.   Respiratory:  Negative for cough and shortness of breath.    Cardiovascular:  Negative for chest pain and palpitations.   Gastrointestinal:  Negative for abdominal pain and vomiting.    Genitourinary:  Negative for dysuria and hematuria.   Musculoskeletal:  Negative for arthralgias and back pain.   Skin:  Negative for color change and rash.   Neurological:  Negative for seizures and syncope.   All other systems reviewed and are negative.        Current Medications       Current Outpatient Medications:     ciprofloxacin-dexamethasone (CIPRODEX) otic suspension, Administer 4 drops to the right ear 2 (two) times a day, Disp: 7.5 mL, Rfl: 0    methylPREDNISolone 4 MG tablet therapy pack, Use as directed on package, Disp: 21 tablet, Rfl: 0    carbamide peroxide (DEBROX) 6.5 % otic solution, Administer 5 drops to the right ear 2 (two) times a day for 4 days, Disp: 2 mL, Rfl: 0    Current Allergies     Allergies as of 12/29/2023    (No Known Allergies)            The following portions of the patient's history were reviewed and updated as appropriate: allergies, current medications, past family history, past medical history, past social history, past surgical history and problem list.     History reviewed. No pertinent past medical history.    Past Surgical History:   Procedure Laterality Date    MOUTH SURGERY      release of upper lip frenulum       Family History   Problem Relation Age of Onset    Hypertension Mother     Mental illness Father     Bipolar disorder Father     Depression Father     Diabetes Father     Kidney cancer Maternal Grandmother     Stroke Maternal Grandmother     Cancer Maternal Grandfather     Cancer Paternal Grandmother     No Known Problems Paternal Grandfather     Hypothyroidism Half-Sister     No Known Problems Half-Sister          Medications have been verified.        Objective   BP 90/61   Pulse (!) 53   Temp 98.6 °F (37 °C)   Resp 18   LMP 12/11/2023 (Exact Date)   SpO2 95%        Physical Exam     Physical Exam  Vitals and nursing note reviewed.   Constitutional:       General: She is not in acute distress.     Appearance: Normal appearance.   HENT:      Head:  "Normocephalic.      Right Ear: External ear normal. There is impacted cerumen.      Left Ear: Tympanic membrane, ear canal and external ear normal.      Nose: Congestion present.      Mouth/Throat:      Mouth: Mucous membranes are moist.      Pharynx: Oropharynx is clear.   Eyes:      Conjunctiva/sclera: Conjunctivae normal.   Cardiovascular:      Rate and Rhythm: Normal rate and regular rhythm.      Pulses: Normal pulses.      Heart sounds: Normal heart sounds.   Pulmonary:      Effort: Pulmonary effort is normal.      Breath sounds: Normal breath sounds.   Lymphadenopathy:      Cervical: No cervical adenopathy.   Skin:     General: Skin is warm.      Capillary Refill: Capillary refill takes less than 2 seconds.   Neurological:      Mental Status: She is alert.           Ear cerumen removal    Date/Time: 12/29/2023 10:00 AM    Performed by: Erasmo Pinto PA-C  Authorized by: Erasmo Pinto PA-C  Universal Protocol:  Consent: Verbal consent obtained.  Risks and benefits: risks, benefits and alternatives were discussed  Consent given by: patient  Time out: Immediately prior to procedure a \"time out\" was called to verify the correct patient, procedure, equipment, support staff and site/side marked as required.  Patient understanding: patient states understanding of the procedure being performed    Patient location:  Bedside  Procedure details:     Location:  R ear    Procedure type: irrigation only    Post-procedure details:     Complication:  Macerated skin    Hearing quality:  Normal  Comments:      R canal red with some macerated skin. TM normal bilat.           "

## 2023-12-29 NOTE — PATIENT INSTRUCTIONS
Cerumen removed from ear.  Canal red and irritated and will treat with 5-day course of Ciprodex.  Will also treat congestion with Medrol Dosepak.  Discussed if symptoms do not improve or resolve finishing course of medication to follow-up with ENT.

## 2024-02-01 ENCOUNTER — APPOINTMENT (OUTPATIENT)
Dept: LAB | Facility: CLINIC | Age: 24
End: 2024-02-01
Payer: COMMERCIAL

## 2024-02-01 DIAGNOSIS — Z01.818 ENCOUNTER FOR OTHER PREPROCEDURAL EXAMINATION: ICD-10-CM

## 2024-02-01 DIAGNOSIS — Z01.812 ENCOUNTER FOR PREPROCEDURAL LABORATORY EXAMINATION: ICD-10-CM

## 2024-02-01 LAB
ANION GAP SERPL CALCULATED.3IONS-SCNC: 7 MMOL/L
BUN SERPL-MCNC: 12 MG/DL (ref 5–25)
CALCIUM SERPL-MCNC: 8.8 MG/DL (ref 8.4–10.2)
CHLORIDE SERPL-SCNC: 107 MMOL/L (ref 96–108)
CO2 SERPL-SCNC: 26 MMOL/L (ref 21–32)
CREAT SERPL-MCNC: 0.84 MG/DL (ref 0.6–1.3)
ERYTHROCYTE [DISTWIDTH] IN BLOOD BY AUTOMATED COUNT: 13 % (ref 11.6–15.1)
GFR SERPL CREATININE-BSD FRML MDRD: 98 ML/MIN/1.73SQ M
GLUCOSE P FAST SERPL-MCNC: 87 MG/DL (ref 65–99)
HCT VFR BLD AUTO: 40.4 % (ref 34.8–46.1)
HGB BLD-MCNC: 13.3 G/DL (ref 11.5–15.4)
MCH RBC QN AUTO: 29.4 PG (ref 26.8–34.3)
MCHC RBC AUTO-ENTMCNC: 32.9 G/DL (ref 31.4–37.4)
MCV RBC AUTO: 89 FL (ref 82–98)
PLATELET # BLD AUTO: 281 THOUSANDS/UL (ref 149–390)
PMV BLD AUTO: 10.9 FL (ref 8.9–12.7)
POTASSIUM SERPL-SCNC: 4.5 MMOL/L (ref 3.5–5.3)
RBC # BLD AUTO: 4.52 MILLION/UL (ref 3.81–5.12)
SODIUM SERPL-SCNC: 140 MMOL/L (ref 135–147)
WBC # BLD AUTO: 7.72 THOUSAND/UL (ref 4.31–10.16)

## 2024-02-01 PROCEDURE — 85027 COMPLETE CBC AUTOMATED: CPT

## 2024-02-01 PROCEDURE — 36415 COLL VENOUS BLD VENIPUNCTURE: CPT

## 2024-02-01 PROCEDURE — 80048 BASIC METABOLIC PNL TOTAL CA: CPT

## 2024-02-14 PROBLEM — N64.81 BREAST PTOSIS: Status: ACTIVE | Noted: 2024-02-14

## 2024-02-16 RX ORDER — CIDER VINEGAR 300 MG
TABLET ORAL
COMMUNITY

## 2024-02-16 NOTE — PRE-PROCEDURE INSTRUCTIONS
Pre-Surgery Instructions:   Medication Instructions    Colostrum 500 MG CAPS Stop taking 7 days prior to surgery.    NON FORMULARY Stop taking 7 days prior to surgery.     Spoke with pt via phone.    Medication instructions for day surgery reviewed. Please use only a sip of water to take your instructed medications. Avoid all over the counter vitamins, supplements and NSAIDS for one week prior to surgery per anesthesia guidelines. Tylenol is ok to take as needed.     You will receive a call one business day prior to surgery with an arrival time and hospital directions. If your surgery is scheduled on a Monday, the hospital will be calling you on the Friday prior to your surgery. If you have not heard from anyone by 8pm, please call the hospital supervisor through the hospital  at [phone]. ([place] [phone] or [place] [phone]).    Do not eat or drink anything after midnight the night before your surgery, including candy, mints, lifesavers, or chewing gum. Do not drink alcohol 24hrs before your surgery. Try not to smoke at least 24hrs before your surgery.       Follow the pre surgery showering instructions as listed in the “My Surgical Experience Booklet” or otherwise provided by your surgeon's office. Do not use a blade to shave the surgical area 1 week before surgery. It is okay to use a clean electric clippers up to 24 hours before surgery. Do not apply any lotions, creams, including makeup, cologne, deodorant, or perfumes after showering on the day of your surgery. Do not use dry shampoo, hair spray, hair gel, or any type of hair products.     No contact lenses, eye make-up, or artificial eyelashes. Remove nail polish, including gel polish, and any artificial, gel, or acrylic nails if possible. Remove all jewelry including rings and body piercing jewelry.     Wear causal clothing that is easy to take on and off. Consider your type of surgery.    Keep any valuables, jewelry, piercings at  home. Please bring any specially ordered equipment (sling, braces) if indicated.    Arrange for a responsible person to drive you to and from the hospital on the day of your surgery. Visitor Guidelines discussed.     Call the surgeon's office with any new illnesses, exposures, or additional questions prior to surgery.    Please reference your “My Surgical Experience Booklet” for additional information to prepare for your upcoming surgery.

## 2024-02-21 ENCOUNTER — ANESTHESIA EVENT (OUTPATIENT)
Dept: PERIOP | Facility: HOSPITAL | Age: 24
End: 2024-02-21
Payer: SELF-PAY

## 2024-02-21 PROBLEM — Z00.129 ENCOUNTER FOR ROUTINE CHILD HEALTH EXAMINATION W/O ABNORMAL FINDINGS: Status: RESOLVED | Noted: 2018-06-25 | Resolved: 2024-02-21

## 2024-02-21 PROBLEM — Z13.31 SCREENING FOR DEPRESSION: Status: RESOLVED | Noted: 2018-06-25 | Resolved: 2024-02-21

## 2024-02-21 PROBLEM — Z13.220 NEED FOR LIPID SCREENING: Status: RESOLVED | Noted: 2018-06-25 | Resolved: 2024-02-21

## 2024-02-23 ENCOUNTER — HOSPITAL ENCOUNTER (OUTPATIENT)
Facility: HOSPITAL | Age: 24
Setting detail: OUTPATIENT SURGERY
Discharge: HOME | End: 2024-02-23
Attending: SURGERY | Admitting: SURGERY
Payer: SELF-PAY

## 2024-02-23 ENCOUNTER — ANESTHESIA (OUTPATIENT)
Dept: PERIOP | Facility: HOSPITAL | Age: 24
End: 2024-02-23
Payer: SELF-PAY

## 2024-02-23 VITALS
TEMPERATURE: 96.7 F | RESPIRATION RATE: 18 BRPM | DIASTOLIC BLOOD PRESSURE: 56 MMHG | OXYGEN SATURATION: 98 % | HEART RATE: 50 BPM | SYSTOLIC BLOOD PRESSURE: 99 MMHG

## 2024-02-23 LAB
EXT PREGNANCY TEST URINE: NEGATIVE
EXT. CONTROL: NORMAL

## 2024-02-23 PROCEDURE — L8600 IMPLANT BREAST SILICONE/EQ: HCPCS | Performed by: SURGERY

## 2024-02-23 PROCEDURE — 81025 URINE PREGNANCY TEST: CPT | Performed by: SURGERY

## 2024-02-23 DEVICE — NATRELLE INSPIRA SCM 330CC MODERATE PROFILE  SMOOTH ROUND SILICONE
Type: IMPLANTABLE DEVICE | Site: BREAST | Status: FUNCTIONAL
Brand: NATRELLE INSPIRA COHESIVE BREAST IMPLANTS

## 2024-02-23 RX ORDER — HYDROMORPHONE HCL/PF 1 MG/ML
SYRINGE (ML) INJECTION AS NEEDED
Status: DISCONTINUED | OUTPATIENT
Start: 2024-02-23 | End: 2024-02-23

## 2024-02-23 RX ORDER — CEFAZOLIN SODIUM 2 G/50ML
2000 SOLUTION INTRAVENOUS ONCE
Status: COMPLETED | OUTPATIENT
Start: 2024-02-23 | End: 2024-02-23

## 2024-02-23 RX ORDER — SODIUM CHLORIDE, SODIUM LACTATE, POTASSIUM CHLORIDE, CALCIUM CHLORIDE 600; 310; 30; 20 MG/100ML; MG/100ML; MG/100ML; MG/100ML
100 INJECTION, SOLUTION INTRAVENOUS CONTINUOUS
Status: DISCONTINUED | OUTPATIENT
Start: 2024-02-23 | End: 2024-02-23 | Stop reason: HOSPADM

## 2024-02-23 RX ORDER — FENTANYL CITRATE/PF 50 MCG/ML
25 SYRINGE (ML) INJECTION
Status: DISCONTINUED | OUTPATIENT
Start: 2024-02-23 | End: 2024-02-23 | Stop reason: HOSPADM

## 2024-02-23 RX ORDER — NEOSTIGMINE METHYLSULFATE 1 MG/ML
INJECTION INTRAVENOUS AS NEEDED
Status: DISCONTINUED | OUTPATIENT
Start: 2024-02-23 | End: 2024-02-23

## 2024-02-23 RX ORDER — GLYCOPYRROLATE 0.2 MG/ML
INJECTION INTRAMUSCULAR; INTRAVENOUS AS NEEDED
Status: DISCONTINUED | OUTPATIENT
Start: 2024-02-23 | End: 2024-02-23

## 2024-02-23 RX ORDER — SODIUM CHLORIDE, SODIUM LACTATE, POTASSIUM CHLORIDE, CALCIUM CHLORIDE 600; 310; 30; 20 MG/100ML; MG/100ML; MG/100ML; MG/100ML
50 INJECTION, SOLUTION INTRAVENOUS CONTINUOUS
Status: DISCONTINUED | OUTPATIENT
Start: 2024-02-23 | End: 2024-02-23 | Stop reason: HOSPADM

## 2024-02-23 RX ORDER — LIDOCAINE HYDROCHLORIDE 10 MG/ML
INJECTION, SOLUTION EPIDURAL; INFILTRATION; INTRACAUDAL; PERINEURAL AS NEEDED
Status: DISCONTINUED | OUTPATIENT
Start: 2024-02-23 | End: 2024-02-23

## 2024-02-23 RX ORDER — ACETAMINOPHEN 10 MG/ML
INJECTION, SOLUTION INTRAVENOUS AS NEEDED
Status: DISCONTINUED | OUTPATIENT
Start: 2024-02-23 | End: 2024-02-23

## 2024-02-23 RX ORDER — EPHEDRINE SULFATE 50 MG/ML
INJECTION INTRAVENOUS AS NEEDED
Status: DISCONTINUED | OUTPATIENT
Start: 2024-02-23 | End: 2024-02-23

## 2024-02-23 RX ORDER — HYDROCODONE BITARTRATE AND ACETAMINOPHEN 5; 325 MG/1; MG/1
1 TABLET ORAL EVERY 4 HOURS PRN
Status: DISCONTINUED | OUTPATIENT
Start: 2024-02-23 | End: 2024-02-23 | Stop reason: HOSPADM

## 2024-02-23 RX ORDER — METOCLOPRAMIDE HYDROCHLORIDE 5 MG/ML
10 INJECTION INTRAMUSCULAR; INTRAVENOUS ONCE AS NEEDED
Status: DISCONTINUED | OUTPATIENT
Start: 2024-02-23 | End: 2024-02-23 | Stop reason: HOSPADM

## 2024-02-23 RX ORDER — PROPOFOL 10 MG/ML
INJECTION, EMULSION INTRAVENOUS CONTINUOUS PRN
Status: DISCONTINUED | OUTPATIENT
Start: 2024-02-23 | End: 2024-02-23

## 2024-02-23 RX ORDER — MAGNESIUM HYDROXIDE 1200 MG/15ML
LIQUID ORAL AS NEEDED
Status: DISCONTINUED | OUTPATIENT
Start: 2024-02-23 | End: 2024-02-23 | Stop reason: HOSPADM

## 2024-02-23 RX ORDER — MIDAZOLAM HYDROCHLORIDE 2 MG/2ML
INJECTION, SOLUTION INTRAMUSCULAR; INTRAVENOUS AS NEEDED
Status: DISCONTINUED | OUTPATIENT
Start: 2024-02-23 | End: 2024-02-23

## 2024-02-23 RX ORDER — ONDANSETRON 2 MG/ML
INJECTION INTRAMUSCULAR; INTRAVENOUS AS NEEDED
Status: DISCONTINUED | OUTPATIENT
Start: 2024-02-23 | End: 2024-02-23

## 2024-02-23 RX ORDER — PROPOFOL 10 MG/ML
INJECTION, EMULSION INTRAVENOUS AS NEEDED
Status: DISCONTINUED | OUTPATIENT
Start: 2024-02-23 | End: 2024-02-23

## 2024-02-23 RX ORDER — FENTANYL CITRATE 50 UG/ML
INJECTION, SOLUTION INTRAMUSCULAR; INTRAVENOUS AS NEEDED
Status: DISCONTINUED | OUTPATIENT
Start: 2024-02-23 | End: 2024-02-23

## 2024-02-23 RX ORDER — KETOROLAC TROMETHAMINE 30 MG/ML
30 INJECTION, SOLUTION INTRAMUSCULAR; INTRAVENOUS ONCE
Status: COMPLETED | OUTPATIENT
Start: 2024-02-23 | End: 2024-02-23

## 2024-02-23 RX ORDER — DEXAMETHASONE SODIUM PHOSPHATE 10 MG/ML
INJECTION, SOLUTION INTRAMUSCULAR; INTRAVENOUS AS NEEDED
Status: DISCONTINUED | OUTPATIENT
Start: 2024-02-23 | End: 2024-02-23

## 2024-02-23 RX ORDER — HYDROMORPHONE HCL IN WATER/PF 6 MG/30 ML
0.2 PATIENT CONTROLLED ANALGESIA SYRINGE INTRAVENOUS
Status: DISCONTINUED | OUTPATIENT
Start: 2024-02-23 | End: 2024-02-23 | Stop reason: HOSPADM

## 2024-02-23 RX ORDER — ROCURONIUM BROMIDE 10 MG/ML
INJECTION, SOLUTION INTRAVENOUS AS NEEDED
Status: DISCONTINUED | OUTPATIENT
Start: 2024-02-23 | End: 2024-02-23

## 2024-02-23 RX ORDER — HYDROCODONE BITARTRATE AND ACETAMINOPHEN 5; 325 MG/1; MG/1
2 TABLET ORAL EVERY 4 HOURS PRN
Status: DISCONTINUED | OUTPATIENT
Start: 2024-02-23 | End: 2024-02-23 | Stop reason: HOSPADM

## 2024-02-23 RX ADMIN — CEFAZOLIN SODIUM 2000 MG: 2 SOLUTION INTRAVENOUS at 10:28

## 2024-02-23 RX ADMIN — ONDANSETRON 4 MG: 2 INJECTION INTRAMUSCULAR; INTRAVENOUS at 12:24

## 2024-02-23 RX ADMIN — ROCURONIUM BROMIDE 40 MG: 10 INJECTION, SOLUTION INTRAVENOUS at 10:18

## 2024-02-23 RX ADMIN — SODIUM CHLORIDE, SODIUM LACTATE, POTASSIUM CHLORIDE, AND CALCIUM CHLORIDE: .6; .31; .03; .02 INJECTION, SOLUTION INTRAVENOUS at 11:48

## 2024-02-23 RX ADMIN — ROCURONIUM BROMIDE 20 MG: 10 INJECTION, SOLUTION INTRAVENOUS at 11:47

## 2024-02-23 RX ADMIN — PROPOFOL 30 MCG/KG/MIN: 10 INJECTION, EMULSION INTRAVENOUS at 10:20

## 2024-02-23 RX ADMIN — NEOSTIGMINE METHYLSULFATE 3 MG: 1 INJECTION INTRAVENOUS at 12:38

## 2024-02-23 RX ADMIN — ONDANSETRON 4 MG: 2 INJECTION INTRAMUSCULAR; INTRAVENOUS at 10:31

## 2024-02-23 RX ADMIN — FENTANYL CITRATE 100 MCG: 50 INJECTION, SOLUTION INTRAMUSCULAR; INTRAVENOUS at 10:17

## 2024-02-23 RX ADMIN — HYDROMORPHONE HYDROCHLORIDE 0.25 MG: 1 INJECTION, SOLUTION INTRAMUSCULAR; INTRAVENOUS; SUBCUTANEOUS at 11:47

## 2024-02-23 RX ADMIN — KETOROLAC TROMETHAMINE 30 MG: 30 INJECTION, SOLUTION INTRAMUSCULAR; INTRAVENOUS at 12:56

## 2024-02-23 RX ADMIN — DEXAMETHASONE SODIUM PHOSPHATE 10 MG: 10 INJECTION, SOLUTION INTRAMUSCULAR; INTRAVENOUS at 10:19

## 2024-02-23 RX ADMIN — LIDOCAINE HYDROCHLORIDE 50 MG: 10 INJECTION, SOLUTION EPIDURAL; INFILTRATION; INTRACAUDAL; PERINEURAL at 10:18

## 2024-02-23 RX ADMIN — SODIUM CHLORIDE, SODIUM LACTATE, POTASSIUM CHLORIDE, AND CALCIUM CHLORIDE: .6; .31; .03; .02 INJECTION, SOLUTION INTRAVENOUS at 09:44

## 2024-02-23 RX ADMIN — ROCURONIUM BROMIDE 20 MG: 10 INJECTION, SOLUTION INTRAVENOUS at 11:12

## 2024-02-23 RX ADMIN — DEXMEDETOMIDINE HYDROCHLORIDE 8 MCG: 100 INJECTION, SOLUTION INTRAVENOUS at 10:13

## 2024-02-23 RX ADMIN — HYDROMORPHONE HYDROCHLORIDE 0.25 MG: 1 INJECTION, SOLUTION INTRAMUSCULAR; INTRAVENOUS; SUBCUTANEOUS at 12:41

## 2024-02-23 RX ADMIN — GLYCOPYRROLATE 0.4 MG: 0.2 INJECTION, SOLUTION INTRAMUSCULAR; INTRAVENOUS at 12:38

## 2024-02-23 RX ADMIN — EPHEDRINE SULFATE 5 MG: 50 INJECTION, SOLUTION INTRAVENOUS at 10:39

## 2024-02-23 RX ADMIN — ROCURONIUM BROMIDE 10 MG: 10 INJECTION, SOLUTION INTRAVENOUS at 10:41

## 2024-02-23 RX ADMIN — EPHEDRINE SULFATE 5 MG: 50 INJECTION, SOLUTION INTRAVENOUS at 11:06

## 2024-02-23 RX ADMIN — ACETAMINOPHEN 1000 MG: 10 INJECTION INTRAVENOUS at 10:43

## 2024-02-23 RX ADMIN — MIDAZOLAM HYDROCHLORIDE 2 MG: 1 INJECTION, SOLUTION INTRAMUSCULAR; INTRAVENOUS at 10:13

## 2024-02-23 RX ADMIN — PROPOFOL 150 MG: 10 INJECTION, EMULSION INTRAVENOUS at 10:18

## 2024-02-23 NOTE — OP NOTE
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  Prepped and draped in sterile surgical fashion.  The breasts were injected with 10 cc of 0.25% Marcaine with epinephrine.  Patient preoperatively was marked in a Wise pattern.  Nipple-areolar complex was circumscribed with a 38 mm cookie cutter.  The entire Craig pattern was then de-epithelialized.  We then elevated bilateral medial and lateral breast skin flaps.  Obtained meticulous hemostasis.  At this point we got down below the pectoralis major muscle elevating the muscle along the inferior medial margins up to the 3 and 9:00 positions on right and left breast respectively.  Hemostasis was obtained.  Pocket was then irrigated with saline and Betadine solution.  A 330 cc SCM Allergan silicone implant was placed underneath the breast.  We then elevated the nipple and closed our incision with deep 2-0 Vicryl 3-0 PDS running subcuticular 3-0 Monocryl stitch and Dermabond dressing.  The same procedure was done to both left and right breast which will be dictated once.  Prior to complete closure we did remove a small amount of tissue from the right breast due to the asymmetry.   We had good symmetry after the procedure.  Nipple was pink.  Patient was placed in a surgical bra.    Patient was extubated and taken to recovery room in stable condition.    All counts correct x2.    I was present for the entire procedure.    Patient Disposition:  PACU         SIGNATURE: Abran Roman MD  DATE: February 23, 2024  TIME: 12:47 PM

## 2024-02-23 NOTE — ANESTHESIA PREPROCEDURE EVALUATION
"Procedure:  MASTOPEXY BREAST (Bilateral: Breast)  AUGMENTATION BREAST (Bilateral: Breast)    Relevant Problems   No relevant active problems        Physical Exam    Airway    Mallampati score: II  TM Distance: >3 FB  Neck ROM: full     Dental       Cardiovascular  Cardiovascular exam normal    Pulmonary  Pulmonary exam normal     Other Findings  post-pubertal.      Anesthesia Plan  ASA Score- 1     Anesthesia Type- general with ASA Monitors.         Additional Monitors:     Airway Plan: ETT.           Plan Factors-Exercise tolerance (METS): >4 METS.    Chart reviewed.   Existing labs reviewed. Patient summary reviewed.    Patient is not a current smoker. Patient not instructed to abstain from smoking on day of procedure. Patient did not smoke on day of surgery.            Induction- intravenous.    Postoperative Plan- Plan for postoperative opioid use.     Informed Consent- Anesthetic plan and risks discussed with patient.  I personally reviewed this patient with the CRNA. Discussed and agreed on the Anesthesia Plan with the CRNA..              No results found for: \"HGBA1C\"    Lab Results   Component Value Date    K 4.5 02/01/2024     02/01/2024    CO2 26 02/01/2024    BUN 12 02/01/2024    CREATININE 0.84 02/01/2024    GLUF 87 02/01/2024    CALCIUM 8.8 02/01/2024    EGFR 98 02/01/2024       Lab Results   Component Value Date    WBC 7.72 02/01/2024    HGB 13.3 02/01/2024    HCT 40.4 02/01/2024    MCV 89 02/01/2024     02/01/2024       "

## 2024-02-23 NOTE — ANESTHESIA POSTPROCEDURE EVALUATION
Post-Op Assessment Note    CV Status:  Stable  Pain Score: 0    Pain management: adequate    Multimodal analgesia used between 6 hours prior to anesthesia start to PACU discharge    Mental Status:  Alert   Hydration Status:  Euvolemic   PONV Controlled:  None   Airway Patency:  Patent     Post Op Vitals Reviewed: Yes    No anethesia notable event occurred.    Staff: SARINA               /69 (02/23/24 1247)    Temp (!) 97.1 °F (36.2 °C) (02/23/24 1247)    Pulse 83 (02/23/24 1247)   Resp 16 (02/23/24 1247)    SpO2 95 % (02/23/24 1247)

## 2024-02-23 NOTE — DISCHARGE SUMMARY
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  Reason for Admission:  Romi Centeno was electively admitted to undergo the above named procedure on 2/23/2024 as an outpatient.  Hospital Course:  Patient underwent the above named procedure on the day of admission without complications. They were discharged home the same day.  Disposition:  To home in care of self and family.  Condition:  Good  Follow up:  Patient with follow up in the office with Dr. Abran Roman MD in 1 week(s) or as scheduled per his office  Abran Roman MD  2/23/2024 10:09 AM

## 2024-02-23 NOTE — DISCHARGE INSTR - AVS FIRST PAGE
[facility]     [facility], [address] [place] [address], [address], [place] [address]   P [phone]/F [phone]/ [employer]       Home Insturctions for the Breast Reduction/Mastopexy Patient     Please call the office today to schedule a post operative appointment, and tell the office staff  that you doctor needs see you in our office in 7-10 days.    You may remove all dressing in the morning and shower, unless you have a bolster attached to your nipple area. If you have a bolster around the nipple region, you will be restricted to sponge bath until the bolster is removed.    Do not bend, lift or strain for 2 weeks following surgery.    Do not drive a car until instructed to do so.    You should avoid lifting anything over 10 lbs for 1-2 weeks    You may shower unless otherwise instructed.    When you arrive home you should remain relaxed. Short walks are permitted after the first week. You should not do any strenuous activities such as cleaning, exercising or shopping until instructed.    If you have small children someone should help you with .    You should make arrangements to be off from work at least two weeks following surgery.    You will be given a prescription for a pain medicine. You can use extra strength Tylenol as a substitute.    We have spent considerable time and effort to make your surgical experience as efficient and pleasant as possible. We would appreciate your suggestions regarding any area of your care, which you think, could be improved to make your experience more pleasant.    You may have drainage around your drain tube.        If you have any questions, please call the office between 9:00 A.M. and 5:00 P.M.     If a problem should arise after hours, the doctor can be reached through the answering service at [phone]

## 2024-02-23 NOTE — INTERVAL H&P NOTE
H&P reviewed. After examining the patient I find no changes in the patients condition since the H&P had been written.    Vitals:    02/23/24 0904   BP: 107/51   Pulse: 63   Resp: 16   Temp: (!) 97.2 °F (36.2 °C)   SpO2: 100%

## 2024-06-26 ENCOUNTER — TELEPHONE (OUTPATIENT)
Age: 24
End: 2024-06-26

## 2024-06-26 NOTE — TELEPHONE ENCOUNTER
Patient called and scheduled a NP appointment for 8/9. Patient is wondering if there is any way she can get in sooner. Patient would like to be seen as soon as possible as she has some concerns she would like to discuss with a pcp. Please advise if there is any availability sooner with Dr. Burks

## 2024-09-04 ENCOUNTER — APPOINTMENT (OUTPATIENT)
Dept: URGENT CARE | Facility: CLINIC | Age: 24
End: 2024-09-04

## 2024-09-04 ENCOUNTER — APPOINTMENT (OUTPATIENT)
Dept: LAB | Facility: CLINIC | Age: 24
End: 2024-09-04

## 2024-09-04 ENCOUNTER — TRANSCRIBE ORDERS (OUTPATIENT)
Dept: URGENT CARE | Facility: CLINIC | Age: 24
End: 2024-09-04

## 2024-09-04 DIAGNOSIS — Z02.1 PRE-EMPLOYMENT EXAMINATION: Primary | ICD-10-CM

## 2024-09-04 DIAGNOSIS — Z02.1 PRE-EMPLOYMENT EXAMINATION: ICD-10-CM

## 2024-09-04 LAB
MEV IGG SER QL IA: NORMAL
MUV IGG SER QL IA: NORMAL
RUBV IGG SERPL IA-ACNC: 40.3 IU/ML
VZV IGG SER QL IA: NORMAL

## 2024-09-04 PROCEDURE — 86765 RUBEOLA ANTIBODY: CPT

## 2024-09-04 PROCEDURE — 86762 RUBELLA ANTIBODY: CPT

## 2024-09-04 PROCEDURE — 36415 COLL VENOUS BLD VENIPUNCTURE: CPT

## 2024-09-04 PROCEDURE — 86735 MUMPS ANTIBODY: CPT

## 2024-09-04 PROCEDURE — 86787 VARICELLA-ZOSTER ANTIBODY: CPT

## 2024-09-04 PROCEDURE — 86480 TB TEST CELL IMMUN MEASURE: CPT

## 2024-09-05 LAB
GAMMA INTERFERON BACKGROUND BLD IA-ACNC: 0.02 IU/ML
M TB IFN-G BLD-IMP: NEGATIVE
M TB IFN-G CD4+ BCKGRND COR BLD-ACNC: 0.01 IU/ML
M TB IFN-G CD4+ BCKGRND COR BLD-ACNC: 0.02 IU/ML
MITOGEN IGNF BCKGRD COR BLD-ACNC: 9.98 IU/ML

## 2024-09-06 ENCOUNTER — OFFICE VISIT (OUTPATIENT)
Dept: FAMILY MEDICINE CLINIC | Facility: CLINIC | Age: 24
End: 2024-09-06
Payer: COMMERCIAL

## 2024-09-06 VITALS
RESPIRATION RATE: 18 BRPM | HEART RATE: 47 BPM | WEIGHT: 148.2 LBS | HEIGHT: 68 IN | OXYGEN SATURATION: 98 % | DIASTOLIC BLOOD PRESSURE: 60 MMHG | TEMPERATURE: 98.6 F | SYSTOLIC BLOOD PRESSURE: 110 MMHG | BODY MASS INDEX: 22.46 KG/M2

## 2024-09-06 DIAGNOSIS — Z13.0 SCREENING FOR DEFICIENCY ANEMIA: ICD-10-CM

## 2024-09-06 DIAGNOSIS — Z13.29 THYROID DISORDER SCREEN: ICD-10-CM

## 2024-09-06 DIAGNOSIS — Z82.49 FAMILY HISTORY OF HEART DISEASE: ICD-10-CM

## 2024-09-06 DIAGNOSIS — Z13.1 ENCOUNTER FOR SCREENING EXAMINATION FOR IMPAIRED GLUCOSE REGULATION AND DIABETES MELLITUS: ICD-10-CM

## 2024-09-06 DIAGNOSIS — Z11.8 SCREENING FOR CHLAMYDIAL DISEASE: ICD-10-CM

## 2024-09-06 DIAGNOSIS — Z13.220 LIPID SCREENING: ICD-10-CM

## 2024-09-06 DIAGNOSIS — Z12.4 CERVICAL CANCER SCREENING: ICD-10-CM

## 2024-09-06 DIAGNOSIS — R55 SYNCOPE, UNSPECIFIED SYNCOPE TYPE: ICD-10-CM

## 2024-09-06 DIAGNOSIS — Z87.898 HISTORY OF SYNCOPE: ICD-10-CM

## 2024-09-06 DIAGNOSIS — Z76.89 ENCOUNTER TO ESTABLISH CARE WITH NEW DOCTOR: Primary | ICD-10-CM

## 2024-09-06 PROBLEM — Z98.82 H/O BREAST AUGMENTATION: Status: ACTIVE | Noted: 2024-02-14

## 2024-09-06 PROBLEM — U07.1 REINFECTION BY COVID-19 VIRUS: Status: RESOLVED | Noted: 2024-09-06 | Resolved: 2024-09-06

## 2024-09-06 PROBLEM — U07.1 REINFECTION BY COVID-19 VIRUS: Status: ACTIVE | Noted: 2024-09-06

## 2024-09-06 PROBLEM — Z86.16 PERSONAL HISTORY OF COVID-19: Status: ACTIVE | Noted: 2024-09-06

## 2024-09-06 PROCEDURE — 99203 OFFICE O/P NEW LOW 30 MIN: CPT | Performed by: FAMILY MEDICINE

## 2024-09-06 NOTE — PROGRESS NOTES
"Assessment/Plan:      Diagnoses and all orders for this visit:    Encounter to establish care with new doctor    Syncope, unspecified syncope type  -     CBC and differential; Future  -     Comprehensive metabolic panel; Future  -     TSH, 3rd generation with Free T4 reflex; Future  -     ECG 12 lead; Future    History of syncope  -     CBC and differential; Future  -     Comprehensive metabolic panel; Future  -     TSH, 3rd generation with Free T4 reflex; Future  -     ECG 12 lead; Future    Family history of heart disease  -     Lipid panel; Future  -     ECG 12 lead; Future    Encounter for screening examination for impaired glucose regulation and diabetes mellitus  -     Hemoglobin A1C; Future    Screening for chlamydial disease  -     Chlamydia/GC amplified DNA by PCR; Future    Screening for deficiency anemia  -     CBC and differential; Future    Lipid screening  -     Lipid panel; Future    Cervical cancer screening  -     Ambulatory Referral to Obstetrics / Gynecology; Future    Thyroid disorder screen  -     TSH, 3rd generation with Free T4 reflex; Future              Subjective:  Chief Complaint   Patient presents with    Establish Care     NP        Patient ID: Romi Centeno is a 24 y.o. female.    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    HENT:  Negative for nosebleeds.    Respiratory: Negative.     Cardiovascular: Negative.    Gastrointestinal:  Negative for blood in stool.   Genitourinary:  Negative for hematuria.   Neurological:  Negative for dizziness, tremors, seizures, " facial asymmetry, speech difficulty, weakness, light-headedness, numbness and headaches.   Hematological: Negative.          Objective:     Physical Exam  Vitals and nursing note reviewed.   Constitutional:       General: She is not in acute distress.     Appearance: She is well-developed and well-groomed. She is not ill-appearing, toxic-appearing or diaphoretic.   HENT:      Head: Normocephalic and atraumatic.      Right Ear: Tympanic membrane, ear canal and external ear normal.      Left Ear: Tympanic membrane, ear canal and external ear normal.      Mouth/Throat:      Lips: Pink.      Mouth: Mucous membranes are moist.      Pharynx: Oropharynx is clear. Uvula midline.   Eyes:      General: Lids are normal.      Extraocular Movements: Extraocular movements intact.      Conjunctiva/sclera: Conjunctivae normal.      Pupils: Pupils are equal, round, and reactive to light.   Neck:      Thyroid: No thyroid mass, thyromegaly or thyroid tenderness.      Vascular: No JVD.      Trachea: Trachea and phonation normal.   Cardiovascular:      Rate and Rhythm: Normal rate and regular rhythm.      Pulses: Normal pulses.      Heart sounds: Normal heart sounds.   Pulmonary:      Effort: Pulmonary effort is normal.      Breath sounds: Normal breath sounds and air entry.   Abdominal:      General: Bowel sounds are normal. There is no distension.      Palpations: Abdomen is soft. There is no hepatomegaly, splenomegaly or mass.      Tenderness: There is no abdominal tenderness.      Hernia: There is no hernia in the ventral area.   Musculoskeletal:      Cervical back: Neck supple.      Right lower leg: No edema.      Left lower leg: No edema.   Lymphadenopathy:      Cervical: No cervical adenopathy.   Skin:     General: Skin is warm and dry.      Coloration: Skin is not pale.   Neurological:      General: No focal deficit present.      Mental Status: She is alert and oriented to person, place, and time.      Sensory: Sensation is  intact.      Motor: Motor function is intact.      Coordination: Coordination is intact.      Gait: Gait is intact.      Deep Tendon Reflexes: Reflexes are normal and symmetric.   Psychiatric:         Mood and Affect: Mood normal.         Behavior: Behavior normal. Behavior is cooperative.         Cognition and Memory: Cognition and memory normal.         Judgment: Judgment normal.

## 2024-10-26 ENCOUNTER — OFFICE VISIT (OUTPATIENT)
Dept: LAB | Facility: HOSPITAL | Age: 24
End: 2024-10-26
Payer: COMMERCIAL

## 2024-10-26 DIAGNOSIS — Z87.898 HISTORY OF SYNCOPE: ICD-10-CM

## 2024-10-26 DIAGNOSIS — Z82.49 FAMILY HISTORY OF HEART DISEASE: ICD-10-CM

## 2024-10-26 DIAGNOSIS — R55 SYNCOPE, UNSPECIFIED SYNCOPE TYPE: ICD-10-CM

## 2024-10-26 LAB
ATRIAL RATE: 37 BPM
P AXIS: 47 DEGREES
PR INTERVAL: 170 MS
QRS AXIS: 56 DEGREES
QRSD INTERVAL: 74 MS
QT INTERVAL: 450 MS
QTC INTERVAL: 353 MS
T WAVE AXIS: 36 DEGREES
VENTRICULAR RATE: 37 BPM

## 2024-10-26 PROCEDURE — 93005 ELECTROCARDIOGRAM TRACING: CPT

## 2024-10-26 PROCEDURE — 93010 ELECTROCARDIOGRAM REPORT: CPT | Performed by: INTERNAL MEDICINE

## 2024-10-31 ENCOUNTER — APPOINTMENT (OUTPATIENT)
Dept: LAB | Facility: CLINIC | Age: 24
End: 2024-10-31
Payer: COMMERCIAL

## 2024-10-31 DIAGNOSIS — Z13.29 THYROID DISORDER SCREEN: ICD-10-CM

## 2024-10-31 DIAGNOSIS — Z82.49 FAMILY HISTORY OF HEART DISEASE: ICD-10-CM

## 2024-10-31 DIAGNOSIS — R55 SYNCOPE, UNSPECIFIED SYNCOPE TYPE: ICD-10-CM

## 2024-10-31 DIAGNOSIS — Z13.0 SCREENING FOR DEFICIENCY ANEMIA: ICD-10-CM

## 2024-10-31 DIAGNOSIS — Z11.8 SCREENING FOR CHLAMYDIAL DISEASE: ICD-10-CM

## 2024-10-31 DIAGNOSIS — N94.3 PMS (PREMENSTRUAL SYNDROME): ICD-10-CM

## 2024-10-31 DIAGNOSIS — Z13.1 ENCOUNTER FOR SCREENING EXAMINATION FOR IMPAIRED GLUCOSE REGULATION AND DIABETES MELLITUS: ICD-10-CM

## 2024-10-31 DIAGNOSIS — Z13.220 LIPID SCREENING: ICD-10-CM

## 2024-10-31 DIAGNOSIS — Z87.898 HISTORY OF SYNCOPE: ICD-10-CM

## 2024-10-31 LAB
ALBUMIN SERPL BCG-MCNC: 4.2 G/DL (ref 3.5–5)
ALP SERPL-CCNC: 67 U/L (ref 34–104)
ALT SERPL W P-5'-P-CCNC: 15 U/L (ref 7–52)
ANION GAP SERPL CALCULATED.3IONS-SCNC: 8 MMOL/L (ref 4–13)
AST SERPL W P-5'-P-CCNC: 17 U/L (ref 13–39)
BASOPHILS # BLD AUTO: 0.05 THOUSANDS/ΜL (ref 0–0.1)
BASOPHILS NFR BLD AUTO: 1 % (ref 0–1)
BILIRUB SERPL-MCNC: 0.66 MG/DL (ref 0.2–1)
BUN SERPL-MCNC: 11 MG/DL (ref 5–25)
CALCIUM SERPL-MCNC: 9 MG/DL (ref 8.4–10.2)
CHLORIDE SERPL-SCNC: 106 MMOL/L (ref 96–108)
CHOLEST SERPL-MCNC: 132 MG/DL
CO2 SERPL-SCNC: 25 MMOL/L (ref 21–32)
CREAT SERPL-MCNC: 0.74 MG/DL (ref 0.6–1.3)
EOSINOPHIL # BLD AUTO: 0.34 THOUSAND/ΜL (ref 0–0.61)
EOSINOPHIL NFR BLD AUTO: 4 % (ref 0–6)
ERYTHROCYTE [DISTWIDTH] IN BLOOD BY AUTOMATED COUNT: 13.2 % (ref 11.6–15.1)
EST. AVERAGE GLUCOSE BLD GHB EST-MCNC: 108 MG/DL
ESTRADIOL SERPL-MCNC: 158.1 PG/ML
GFR SERPL CREATININE-BSD FRML MDRD: 114 ML/MIN/1.73SQ M
GLUCOSE P FAST SERPL-MCNC: 79 MG/DL (ref 65–99)
HBA1C MFR BLD: 5.4 %
HCT VFR BLD AUTO: 41 % (ref 34.8–46.1)
HDLC SERPL-MCNC: 61 MG/DL
HGB BLD-MCNC: 13.5 G/DL (ref 11.5–15.4)
IMM GRANULOCYTES # BLD AUTO: 0.04 THOUSAND/UL (ref 0–0.2)
IMM GRANULOCYTES NFR BLD AUTO: 0 % (ref 0–2)
LDLC SERPL CALC-MCNC: 62 MG/DL (ref 0–100)
LYMPHOCYTES # BLD AUTO: 1.74 THOUSANDS/ΜL (ref 0.6–4.47)
LYMPHOCYTES NFR BLD AUTO: 18 % (ref 14–44)
MCH RBC QN AUTO: 29 PG (ref 26.8–34.3)
MCHC RBC AUTO-ENTMCNC: 32.9 G/DL (ref 31.4–37.4)
MCV RBC AUTO: 88 FL (ref 82–98)
MONOCYTES # BLD AUTO: 0.76 THOUSAND/ΜL (ref 0.17–1.22)
MONOCYTES NFR BLD AUTO: 8 % (ref 4–12)
NEUTROPHILS # BLD AUTO: 6.55 THOUSANDS/ΜL (ref 1.85–7.62)
NEUTS SEG NFR BLD AUTO: 69 % (ref 43–75)
NONHDLC SERPL-MCNC: 71 MG/DL
NRBC BLD AUTO-RTO: 0 /100 WBCS
PLATELET # BLD AUTO: 274 THOUSANDS/UL (ref 149–390)
PMV BLD AUTO: 10.7 FL (ref 8.9–12.7)
POTASSIUM SERPL-SCNC: 4.7 MMOL/L (ref 3.5–5.3)
PROT SERPL-MCNC: 7.2 G/DL (ref 6.4–8.4)
RBC # BLD AUTO: 4.66 MILLION/UL (ref 3.81–5.12)
SODIUM SERPL-SCNC: 139 MMOL/L (ref 135–147)
TESTOST SERPL-MSCNC: 28 NG/DL
TRIGL SERPL-MCNC: 47 MG/DL
TSH SERPL DL<=0.05 MIU/L-ACNC: 1.09 UIU/ML (ref 0.45–4.5)
WBC # BLD AUTO: 9.48 THOUSAND/UL (ref 4.31–10.16)

## 2024-10-31 PROCEDURE — 80061 LIPID PANEL: CPT

## 2024-10-31 PROCEDURE — 85025 COMPLETE CBC W/AUTO DIFF WBC: CPT

## 2024-10-31 PROCEDURE — 82670 ASSAY OF TOTAL ESTRADIOL: CPT

## 2024-10-31 PROCEDURE — 87491 CHLMYD TRACH DNA AMP PROBE: CPT

## 2024-10-31 PROCEDURE — 84443 ASSAY THYROID STIM HORMONE: CPT

## 2024-10-31 PROCEDURE — 80053 COMPREHEN METABOLIC PANEL: CPT

## 2024-10-31 PROCEDURE — 84403 ASSAY OF TOTAL TESTOSTERONE: CPT

## 2024-10-31 PROCEDURE — 36415 COLL VENOUS BLD VENIPUNCTURE: CPT

## 2024-10-31 PROCEDURE — 87591 N.GONORRHOEAE DNA AMP PROB: CPT

## 2024-10-31 PROCEDURE — 83036 HEMOGLOBIN GLYCOSYLATED A1C: CPT

## 2024-11-01 LAB
C TRACH DNA SPEC QL NAA+PROBE: NEGATIVE
N GONORRHOEA DNA SPEC QL NAA+PROBE: NEGATIVE

## 2025-04-08 NOTE — PATIENT INSTRUCTIONS
Bacterial Vaginosis   WHAT YOU NEED TO KNOW:   Bacterial vaginosis (BV) is an infection in the vagina  It may cause vaginitis, which is irritation and inflammation of the vagina  DISCHARGE INSTRUCTIONS:   Medicines:   · Antibiotics: These are given to kill the bacteria that cause BV  They may be given as a pill or a cream to put in your vagina  Take or use as directed  · Take your medicine as directed  Contact your healthcare provider if you think your medicine is not helping or if you have side effects  Tell him of her if you are allergic to any medicine  Keep a list of the medicines, vitamins, and herbs you take  Include the amounts, and when and why you take them  Bring the list or the pill bottles to follow-up visits  Carry your medicine list with you in case of an emergency  Prevent bacterial vaginosis:   · Keep your vaginal area clean and dry:  Wear underwear and pantyhose with a cotton crotch  Wipe from front to back after you urinate or have a bowel movement  After bathing, rinse soap from your vaginal area to decrease your risk for irritation  · Do not use products that cause irritation:  Always use unscented tampons or sanitary pads  Do not use feminine sprays, powders, or scented tampons because they may cause irritation and increase your risk of BV  Detergents and fabric softeners may also cause irritation  · Do not douche: This can cause an imbalance in healthy vaginal bacteria  · Use latex condoms: This helps prevent another infection and keeps your partner from getting the infection  Contact your healthcare provider if:   · Your symptoms come back or do not improve with treatment  · You have vaginal bleeding that is not your monthly period  · You have questions or concerns about your condition or care  © 2017 Kylie Johnson Information is for End User's use only and may not be sold, redistributed or otherwise used for commercial purposes   All illustrations and Physical Therapy Visit    Work comp, filed in [place]  No auth required  Claim number and  info in registration at time of eval.     From Eval: DOI: 8/20/24 & DOS: 11/26/24  Had PT Eval 9/23/24 then 1 follow-up PT appt 10/2/24. Was off work until ~9/18/24. Got XR eyes due to metal in eyes in past & then MRI 9/30/24 and also a cortisone injection 10/8/24, injection didn't help. Went back to work for a few weeks light duty (5#) and then had another follow-up appt 11/7/24 and the decision was made to have surgery 11/26/24. Sees PA 12/10/24 for post-op follow-up appt.   From 9/23/24 PT Eval: \"Patient arrives to initial evaluation reporting right shoulder pain following work-related incident on August 20th. 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 He felt a snapping/popping sensation in the shoulder.  Since incident, he reports a significant increase in pain and limited range of motion. Reaching out to the side is extremely painful. Numbness down right upper extremities. Dull achy sensation. Currently off of work (9/18).\" (Pan of dead dog into crematorium)     Hand Dominance: Left-handed  Range of Motion (ROM) (degrees unless noted; active unless noted; norms in ( ); negative=lacking to 0, positive=beyond 0)  Shoulder:    - Flexion (180):         Right: 78 pain; 1/6/25: 153 pain; 2/10/25: 170 pain    - Abduction (180):         Right: 61 pain; 1/6/25: 155 pain; 2/10/25: 170 pain    - Internal Rotation:          Right: pain        - at 45°:             Right: 58; 1/6/25: 50 (@90) pain; 2/10/25: 58 @ 90    - External Rotation:         Right: pain       - at 45°:             Right: 30; 1/6/25: 92 (@90) pain; 2/10/25: WNL       2/10/25: Functional Range of Motion (ROM)       - Place hand on opposite shoulder:   Left: Normal   Right: Normal        - Touch top of head:   Left: Normal   Right: Normal        - Place hand behind neck:   Left: Normal   Right: Normal        - Place hand behind back:    Left: Normal   Right: Normal (R 1.5 inches < L)        - Over head reach:   Left: Normal   Right: Normal       2/10/25: Strength (out of 5 unless noted, standard test position unless noted)   Shoulder:     - Flexion:          Left: 4-          Right: 3+, pain; 4/1/25: 4- pain     - Abduction:         Left: 4-         Right: 3+, pain; 4/1/25: 4- pain    - Internal Rotation:           Left (at 0°): 5         Right (at 0°): 4+, pain; 4/1/25: 5    - External Rotation:          Left (at 0°): 4         Right (at 0°): 4-; 4/1/25: 4 pain    Elbow/Forearm:    - Flexion:         Left: 5         Right: 5       EVAL: Outcome/Assessments  Outcome Measures:   Quick Disabilities of the Arm, Shoulder and Hand: QuickDash Total Score (Score will not calculate if more then 2 questions are left blank): 81.82   (scored 0-100; a higher score indicates greater disability) see flowsheet for additional documentation    9/30/24 EXAM: MRI SHOULDER RIGHT WO CONTRAST.  HISTORY: right rotator cuff injury - suspect supraspinatus / subscapularis  injury / tear. Notes:   right rotator cuff injury - suspect supraspinatus /  subscapularis injury / tear    COMPARISON: No available prior comparison.  TECHNIQUE:  Noncontrast MRI right shoulder.  FINDINGS: Motion artifact mildly degrades image quality.  ROTATOR CUFF: Tendinopathy and partial-thickness tears of the supraspinatus tendon, involving about 20% of the tendon thickness. The remaining rotator cuff tendons are grossly intact. Rotator cuff musculature is preserved.  AC JOINT: Moderate AC joint hypertrophic arthropathy. Associated mild mass effect upon the underlying supraspinatus. Mild fluid at the  acromioclavicular joint. Coracoclavicular ligament is intact. The acromion  is laterally downsloping, type III.  BICEPS TENDON: LHBT is normally situated within the bicipital groove.  Associated mild tenosynovial fluid along the extraarticular portion without significant joint fluid, suggestive of  images included in CareNotes® are the copyrighted property of A D A M [employer]  or Nirmal Ballard  The above information is an  only  It is not intended as medical advice for individual conditions or treatments  Talk to your doctor, nurse or pharmacist before following any medical regimen to see if it is safe and effective for you  VULVAR HYGEINE    Do not use panty hose, instead use thigh high or knee-high stockings  Avoid synthetic underwear and use cotton underwear or no underwear  Avoid jeans and other tight pants and wear loose pants, skirts or dresses  Avoid swimsuits, leotards, thongs or tight clothes and use loose fitting cotton clothes  Avoid panty liners and use tampons or cotton pads  Avoid scented soaps and use fragrance free pH neutral soap such as Dove soap  Avoid bubble baths and use tub baths in the morning and at night with nothing added to the water and at a comfortable temperature  Avoid scented detergents and use unscented detergents  Avoid wash cloths and use finger tips for washing or a perineal bottle  Avoid feminine sprays, douches or powders  These are not necessary, do not use them  Avoid dyed toilet paper and other products  Use toilet paper without dyes  Avoid hair dryer to dry vulva  Dry vulva by gently patting with a clean towel  Do not rub dry  Wellness Visit for Adults   AMBULATORY CARE:   A wellness visit  is when you see your healthcare provider to get screened for health problems  You can also get advice on how to stay healthy  Write down your questions so you remember to ask them  Ask your healthcare provider how often you should have a wellness visit  What happens at a wellness visit:  Your healthcare provider will ask about your health, and your family history of health problems  This includes high blood pressure, heart disease, and cancer   He or she will ask if you have symptoms that concern you, if you smoke, and about your mood  You may also be asked about your intake of medicines, supplements, food, and alcohol  Any of the following may be done:  · Your weight  will be checked  Your height may also be checked so your body mass index (BMI) can be calculated  Your BMI shows if you are at a healthy weight  · Your blood pressure  and heart rate will be checked  Your temperature may also be checked  · Blood and urine tests  may be done  Blood tests may be done to check your cholesterol levels  Abnormal cholesterol levels increase your risk for heart disease and stroke  You may also need a blood or urine test to check for diabetes if you are at increased risk  Urine tests may be done to look for signs of an infection or kidney disease  · A physical exam  includes checking your heartbeat and lungs with a stethoscope  Your healthcare provider may also check your skin to look for sun damage  · Screening tests  may be recommended  A screening test is done to check for diseases that may not cause symptoms  The screening tests you may need depend on your age, gender, family history, and lifestyle habits  For example, colorectal screening may be recommended if you are 48years old or older  Screening tests you need if you are a woman:   · A Pap smear  is used to screen for cervical cancer  Pap smears are usually done every 3 to 5 years depending on your age  You may need them more often if you have had abnormal Pap smear test results in the past  Ask your healthcare provider how often you should have a Pap smear  · A mammogram  is an x-ray of your breasts to screen for breast cancer  Experts recommend mammograms every 2 years starting at age 48 years  You may need a mammogram at age 52 years or younger if you have an increased risk for breast cancer  Talk to your healthcare provider about when you should start having mammograms and how often you need them  Vaccines you may need:   · Get an influenza vaccine  every year   The tenosynovitis.  LABRUM: Extensive separation type tears are noted along the superior  labrum, extending both anteriorly and posteriorly. These appear near  circumferential, with extension to the inferior labrum best seen on series 4 image 12. Blunting of the free edge is likely related to degenerative  fraying.  GLENOHUMERAL JOINT: Moderate glenohumeral chondromalacia, allowing for the nonarthrographic examination. Mild hypertrophic degenerative changes.  FLUID: Moderate glenohumeral joint effusion, with synovial prominence and irregularity about the joint spaces indicating underlying synovitis. Trace  fluid/edema in the subacromial/subdeltoid bursa.  OSSEOUS: No significant marrow signal abnormality. Moderate subcortical cyst formation deep to the rotator cuff insertion.  IMPRESSION:  1. Tendinosis/tendinopathy and partial-thickness tear of the supraspinatus tendon.  2. Extensive separation type labral tear is noted throughout the labrum.  3. Long head biceps tenosynovitis.  4. Moderate joint effusion with synovitis.  5. Moderate degenerative changes.     POSTOP rehab needed?  YES  ... Start Date for PostOp Rehab per Surgeon: PO day # 5   Surgery:  RIGHT SHOULDER ARTHROSCOPIC EXTENSIVE DEBRIDEMENT AND MINI-OPEN SUBPECTORAL BICEPS TENODESIS. DX: Right shoulder biceps tenosynovitis, degenerative circumferential labral tear, glenohumeral arthritis (grade 3-4 glenoid).    DOS: 11/26/24   Surgeon: DR SAMSON   Visits per week and duration of PT, to be determined by therapist based on physician protocol   Visit Type: Daily Treatment Note  Visit: 29  Referring Provider: Jayna Samson MD  Medical Diagnosis (from order): S46.219A - Biceps tendon rupture  R26.9 - Abnormality of gait and mobility  R68.89 - Activity intolerance  M25.519 - Shoulder pain     SUBJECTIVE                                                                                                               DOS 11/26/24.  My bicep is still good. Saw  PA 4/3/25 with same work restrictions of \"Limit lifting to a maximum of 5 pounds, Limit overhead activities.\" She did a cortisone injection in the bursa, feeling ~40% better. Max pain still 10/10 with a certain movement or position but not as frequent as before injection - like when lying on side & shifts around. Next follow-up appointment this Thurs 5/1/25. Will be out of town 4/18/25-4/27/25.  3/10 RIGHT shoulder  0/10 RIGHT bicep & upper arm    Note: Past /[employer] injury - carrying many boxes of ammunition weighing ~48 pounds (Left shoulder); 70% disabled from injuries e.g. Retired Oct 2021. L4-5 Lumbar fusion 2009.       OBJECTIVE                                                                                                                    Hand Dominance: left-handed                  Treatment     Therapeutic Exercise  Upper Body Ergometer - Seat 2, Level 5.3, 3 min fwd 3 min bwd, manual, for increased activity tolerance & shoulder ROM & subjective taken    Pulley (Back to pulley): Flex 1 min, Scap 1 min, aBd 1 min    Body blade (small): IR/ER by side 30 sec    Body blade (small): Flex/scap/aBd at 90 palm down & thumb up 30 sec each - difficulty with aBd thumb up  4 point rocking: Fwd/bwd x 10 reps  4 point rocking: Side/side x 10 reps    NOT TODAY:  Prone Swiss ball walkouts, tiring  Balance board in push-up position side/side x 10  Balance board in push-up position fwd/bwd x 10  Small red med ball toss on rebounder  Wall arcs with small red med ball x 10  Reverse flys 3# x 3 sets    Manual Therapy   GH jt mobs: long axis & lateral distraction, post, inf, post/inf glides   PROM R shoulder flex, scap, aBd, ER, IR with focus on overhead and IR \"feels good\"  NOT TODAY: STM R deltoid, suprascap/UT, med scap, ERs, subscap > upper arm      Skilled input: verbal instruction/cues, tactile instruction/cues and as detailed above    Writer verbally educated and received verbal consent for hand placement,  influenza vaccine protects you from the flu  Several types of viruses cause the flu  The viruses change over time, so new vaccines are made each year  · Get a tetanus-diphtheria (Td) booster vaccine  every 10 years  This vaccine protects you against tetanus and diphtheria  Tetanus is a severe infection that may cause painful muscle spasms and lockjaw  Diphtheria is a severe bacterial infection that causes a thick covering in the back of your mouth and throat  · Get a human papillomavirus (HPV) vaccine  if you are female and aged 23 to 32 or male 23 to 24 and never received it  This vaccine protects you from HPV infection  HPV is the most common infection spread by sexual contact  HPV may also cause vaginal, penile, and anal cancers  · Get a pneumococcal vaccine  if you are aged 72 years or older  The pneumococcal vaccine is an injection given to protect you from pneumococcal disease  Pneumococcal disease is an infection caused by pneumococcal bacteria  The infection may cause pneumonia, meningitis, or an ear infection  · Get a shingles vaccine  if you are aged 61 or older, even if you have had shingles before  The shingles vaccine is an injection to protect you from the varicella-zoster virus  This is the same virus that causes chickenpox  Shingles is a painful rash that develops in people who had chickenpox or have been exposed to the virus  How to eat healthy:  My Plate is a model for planning healthy meals  It shows the types and amounts of foods that should go on your plate  Fruits and vegetables make up about half of your plate, and grains and protein make up the other half  A serving of dairy is included on the side of your plate  The amount of calories and serving sizes you need depends on your age, gender, weight, and height  Examples of healthy foods are listed below:  · Eat a variety of vegetables  such as dark green, red, and orange vegetables   You can also include canned vegetables low in positioning of patient, and techniques to be performed today from patient for clothing adjustments for techniques, therapist position for techniques and hand placement and palpation for techniques as described above and how they are pertinent to the patient's plan of care.  Home Exercise Program  [id number]  Eval: Reminder of Physician guidelines (cues to avoid WBing thru RUE with transfers, etc)  Review of resting position with pillow if sling off  Wrist flex/ext & sup/pron x 10-20  Elbow ext/flex x 10-20  Codman's flex/ext, cw/ccw, side/side 30 sec  Bwd shoulder circles x 10-20  Scap retraction 3 sec x 10 reps  Scap post depress 3 sec x 10 reps  12/4/24: Seated flex PROM/AAROM x 10  12/9/24: Scap clocks cw/ccw - center to each number; 12/18/24: May add diagonals  Added seated shoulder PROM/AAROM scap x 10   Added seated shoulder PROM/AAROM aBd x 10  Wrist flexor stretch with elbow flexed only 30 sec holds  Wrist extensor stretch with elbow flexed or extended 30 sec holds  12/18/24: Shoulder Submax isometrics: 5 sec x 10 reps each - IR, ER, aDd, aBd (3-7x/wk) (with towel roll & scap set)  REMINDER: 1) Ice 20 min 1+x/day for inflammation and pain.   REMINDER: 2) Celebrex as prescribed as needed for inflammation and pain.   3) Scar mobilization 1x/day - don't slide on skin, move deeper tissues. Use left thumb or finger, overlapping thumb/finger pad. Move side/side, cw/ccw, diagonals, etc. Bye bye gummy worm.  12/20/24: Self-STM techniques using ball on wall to R pecs, ERs, interscap, subscap/lateral scap border, etc  12/27/24: Supine ER AAROM with cane with arm by side x 20   Supine ER AAROM with cane ~45 scap aBd x 20  Supine ER AAROM with cane ~90 scap aBd x 20  12/30/24: Shoulder flex AROM x 10-20  Shoulder scap AROM x 10-20  Shoulder aBd AROM x 10-20  1/3/25: Shoulder ER band in neutral step-outs NO SUPINATING with red band  Shoulder IR band in neutral step-outs with green band  1/6/25: Prone shld aBd  sodium (salt) and frozen vegetables without added butter or sauces  · Eat a variety of fresh fruits , canned fruit in 100% juice, frozen fruit, and dried fruit  · Include whole grains  At least half of the grains you eat should be whole grains  Examples include whole-wheat bread, wheat pasta, brown rice, and whole-grain cereals such as oatmeal     · Eat a variety of protein foods such as seafood (fish and shellfish), lean meat, and poultry without skin (turkey and chicken)  Examples of lean meats include pork leg, shoulder, or tenderloin, and beef round, sirloin, tenderloin, and extra lean ground beef  Other protein foods include eggs and egg substitutes, beans, peas, soy products, nuts, and seeds  · Choose low-fat dairy products such as skim or 1% milk or low-fat yogurt, cheese, and cottage cheese  · Limit unhealthy fats  such as butter, hard margarine, and shortening  Exercise:  Exercise at least 30 minutes per day on most days of the week  Some examples of exercise include walking, biking, dancing, and swimming  You can also fit in more physical activity by taking the stairs instead of the elevator or parking farther away from stores  Include muscle strengthening activities 2 days each week  Regular exercise provides many health benefits  It helps you manage your weight, and decreases your risk for type 2 diabetes, heart disease, stroke, and high blood pressure  Exercise can also help improve your mood  Ask your healthcare provider about the best exercise plan for you  General health and safety guidelines:   · Do not smoke  Nicotine and other chemicals in cigarettes and cigars can cause lung damage  Ask your healthcare provider for information if you currently smoke and need help to quit  E-cigarettes or smokeless tobacco still contain nicotine  Talk to your healthcare provider before you use these products  · Limit alcohol    A drink of alcohol is 12 ounces of beer, 5 ounces of wine, or 1½ ounces of liquor  · Lose weight, if needed  Being overweight increases your risk of certain health conditions  These include heart disease, high blood pressure, type 2 diabetes, and certain types of cancer  · Protect your skin  Do not sunbathe or use tanning beds  Use sunscreen with a SPF 15 or higher  Apply sunscreen at least 15 minutes before you go outside  Reapply sunscreen every 2 hours  Wear protective clothing, hats, and sunglasses when you are outside  · Drive safely  Always wear your seatbelt  Make sure everyone in your car wears a seatbelt  A seatbelt can save your life if you are in an accident  Do not use your cell phone when you are driving  This could distract you and cause an accident  Pull over if you need to make a call or send a text message  · Practice safe sex  Use latex condoms if are sexually active and have more than one partner  Your healthcare provider may recommend screening tests for sexually transmitted infections (STIs)  · Wear helmets, lifejackets, and protective gear  Always wear a helmet when you ride a bike or motorcycle, go skiing, or play sports that could cause a head injury  Wear protective equipment when you play sports  Wear a lifejacket when you are on a boat or doing water sports  © 2017 [facility] Information is for End User's use only and may not be sold, redistributed or otherwise used for commercial purposes  All illustrations and images included in CareNotes® are the copyrighted property of A D A M , Inc  or Nirmal Ballard  The above information is an  only  It is not intended as medical advice for individual conditions or treatments  Talk to your doctor, nurse or pharmacist before following any medical regimen to see if it is safe and effective for you  Breast Self Exam for Women   WHAT YOU NEED TO KNOW:   A BSE is a way to check your breasts for lumps and other changes   Regular BSEs can help you slides/walks x 10 reps, 1/13/25: OR STANDING  1/6/25: R Sleeper/side-lying posterior capsule stretch 30 sec x 4 reps  1/13/25: R Towel IR stretch 30 sec x 3 reps  1/27/25: Instructed in workstation ergonomics  Progressed IR/ER step-outs to IR/ER bands (3x/wk)  Prone scapular exercises: 0# Rhomboid 3 sec x 10 reps, Horizontal aBd 3 sec x 10 reps, Mid Trap 3 sec x 10 rep (3x/wk)  2/3/25: ADDED lower trap 3 sec x 10 reps (3x/wk)  Bicep green band (with progression to blue) 2-3 x 10-20 reps (3x/wk)  2/6/25: IR red band at shoulder height 2-3 x 10-20 (3x/wk)  ER yellow band at shoulder height 2-3 x 10-20 (3x/wk)  2/10/25: 3 way DBell raises: Flex 2# thumb up x 10 reps; palm down x 10 reps, Scap 2# thumb up x 10 reps; palm down x 10 reps, aBd 2# thumb up x 10 reps; palm down x 10 reps; 2/20/25: Pt up to 5#   2/13/25: Sitting posture with/without pole \"Position of Attention - POA\"  Instructed in option of seated postural supports: Lumbar roll, Pelvic roll OR seat wedge   2/17/25: Bicep curls 10# x 2-3 sets, Hammer curls 10# x 1 set, Reverse curls 10# x 1 set (3x/wk)  Tricep skullcrushers 8# x 10 reps neutral , 8# x 10 reps regular /palm up, 5# x 10 reps reverse /palm down (3x/wk)  2/24/25: Yellow band PNF diagonal 2 lower trap (3x/wk)  2/27/25: OH presses (palms fwd) 5# x 10-20 reps  OH presses (palms facing each other) 5# x 10-20 reps  2/27/25: Horizontal aBd yellow band palms down, palms up, thumbs up x 10 each (Option to prone)   -prone/side planks - may add rotations      ASSESSMENT                                                                                                            Pt had been experiencing continued R shoulder pain deep in joint & reports of jt noises, received cortisone injection 4/3/25. Less pain at end range PROM today. Await 5/1/25 PA appt.   Education:   - Results of above outlined education: Verbalizes understanding and Demonstrates understanding    PLAN                                                                                                                            Suggestions for next session as indicated: Progress per plan of care  Pt has 1 more appts scheduled thru 3/26/25; Will be out of town 4/18-4/27; AWAIT 5/1/25 APPT  Physician guidelines: Phase IV 12-20 wks post-op (2/19/24-4/15/25) - May initiate plyometrics below shoulder ht progressing to OH, then 1 hand below shoulder ht progressing to OH.  UBE/Pulley  Review Dynamic hug prn; 4 pt wt shifts, etc  Renegade rows, Inch worm, Prone Swiss ball walkouts; Balance board in push-up position  MT: ROM, jt mobs, STM - bicep, deltoid, suprascap/UT, ERs, etc prn; STM L shoulder  Phase V 20+wks (4/16/25): Initiate throwing program, sport specific activities       Therapy procedure time and total treatment time can be found documented on the Time Entry flowsheet     know how your breasts normally look and feel  Most breast lumps or changes are not cancer, but you should always have them checked by a healthcare provider  Your healthcare provider can also watch you do a BSE and can tell you if you are doing your BSE correctly  DISCHARGE INSTRUCTIONS:   Contact your healthcare provider if:   · You find any lumps or changes in your breasts  · You have breast pain or fluid coming from your nipples  · You have questions or concerns about your condition or care  Why you should do a BSE:  Breast cancer is the most common type of cancer in women  Even if you have mammograms, you may still want to do a BSE regularly  If you know how your breasts normally feel and look, it may help you know when to contact your healthcare provider  Mammograms can miss some cancers  You may find a lump during a BSE that did not show up on your mammogram   When you should do a BSE:  Gaurang Castroer your calendar to help you remember to do BSE on a regular schedule  One easy way to remember to do a BSE is to do the exam on the same day of each month  If you have periods, you may want to do your BSE 1 week after your period ends  This is the time when your breasts may be the least swollen, lumpy, or tender  You can do regular BSEs even if you are breastfeeding or have breast implants  How to do a BSE:   · Look at your breasts in a mirror  Look at the size and shape of each breast and nipple  Check for swelling, lumps, dimpling, scaly skin, or other skin changes  Look for nipple changes, such as a nipple that is painful or beginning to pull inward  Gently squeeze both nipples and check to see if fluid (that is not breast milk) comes out of them  If you find any of these or other breast changes, contact your healthcare provider  Check your breasts while you sit or  the following 3 positions:    [facility] your arms down at your sides  ¨ Raise your hands and join them behind your head       ¨ Put firm pressure with your hands on your hips  Bend slightly forward while you look at your breasts in the mirror  · Lie down and feel your breasts  When you lie down, your breast tissue spreads out evenly over your chest  This makes it easier for you to feel for lumps and anything that may not be normal for your breasts  Do a BSE on one breast at a time  ¨ Place a small pillow or towel under your left shoulder  Put your left arm behind your head  ¨ Use the 3 middle fingers of your right hand  Use your fingertip pads, on the top of your fingers  Your fingertip pad is the most sensitive part of your finger  ¨ Use small circles to feel your breast tissue  Use your fingertip pads to make dime-sized, overlapping circles on your breast and armpits  Use light, medium, and firm pressure  First, press lightly  Second, press with medium pressure to feel a little deeper into the breast  Last, use firm pressure to feel deep within your breast     ¨ Examine your entire breast area  Examine the breast area from above the breast to below the breast where you feel only ribs  Make small circles with your fingertips, starting in the middle of your armpit  Make circles going up and down the breast area  Continue toward your breast and all the way across it  Examine the area from your armpit all the way over to the middle of your chest (breastbone)  Stop at the middle of your chest     ¨ Move the pillow or towel to your right shoulder, and put your right arm behind your head  Use the 3 fingertip pads of your left hand, and repeat the above steps to do a BSE on your right breast        What else you can do to check for breast problems or cancer:  Some experts suggest that women 36years of age or older should have a mammogram every year  Other experts suggest that women between the ages of 48and 76years old should have a mammogram every 2 years   Talk to your healthcare provider about when you should have a mammogram   Follow up with your healthcare provider as directed: Your healthcare provider can watch you and tell you if you are doing your BSE correctly  Write down your questions so you remember to ask them during your visits  © 2017 [employer] Information is for End User's use only and may not be sold, redistributed or otherwise used for commercial purposes  All illustrations and images included in CareNotes® are the copyrighted property of [employer]  or Nirmal Ballard  The above information is an  only  It is not intended as medical advice for individual conditions or treatments  Talk to your doctor, nurse or pharmacist before following any medical regimen to see if it is safe and effective for you

## (undated) DEVICE — SPONGE LAP 18 X 18 IN STRL RFD

## (undated) DEVICE — BASIC PACK: Brand: CONVERTORS

## (undated) DEVICE — ANTIBACTERIAL UNDYED BRAIDED (POLYGLACTIN 910), SYNTHETIC ABSORBABLE SUTURE: Brand: COATED VICRYL

## (undated) DEVICE — ABDOMINAL PAD: Brand: DERMACEA

## (undated) DEVICE — STERILE POLYISOPRENE POWDER-FREE SURGICAL GLOVES: Brand: PROTEXIS

## (undated) DEVICE — SKIN MARKER DUAL TIP WITH RULER CAP, FLEXIBLE RULER AND LABELS: Brand: DEVON

## (undated) DEVICE — UNDYED MONOFILAMENT (POLYDIOXANONE), ABSORBABLE SURGICAL SUTURE: Brand: PDS

## (undated) DEVICE — POV-IOD SOLUTION 4OZ BT

## (undated) DEVICE — BULB SYRINGE,IRRIGATION WITH PROTECTIVE CAP: Brand: DOVER

## (undated) DEVICE — ELECTRODE BLADE MOD E-Z CLEAN 2.5IN 6.4CM -0012M

## (undated) DEVICE — SINGLE PORT MANIFOLD: Brand: NEPTUNE 2

## (undated) DEVICE — PREMIUM DRY TRAY LF: Brand: MEDLINE INDUSTRIES, INC.

## (undated) DEVICE — SUT PDS II 3-0 SH 27 IN Z316H

## (undated) DEVICE — PENCIL ELECTROSURG E-Z CLEAN -0035H

## (undated) DEVICE — ADHESIVE SKIN HIGH VISCOSITY EXOFIN PRECISION PEN

## (undated) DEVICE — SYRINGE 30ML LL

## (undated) DEVICE — NEEDLE 25G X 1 1/2

## (undated) DEVICE — GLOVE SRG LF STRL BGL SKNSNS 6.5 PF

## (undated) DEVICE — DISPOSABLE OR TOWEL: Brand: CARDINAL HEALTH

## (undated) DEVICE — NEEDLE BLUNT 18 G X 1 1/2IN

## (undated) DEVICE — SCD SEQUENTIAL COMPRESSION COMFORT SLEEVE MEDIUM KNEE LENGTH: Brand: KENDALL SCD

## (undated) DEVICE — NEPTUNE E-SEP SMOKE EVACUATION PENCIL, COATED, 70MM BLADE, PUSH BUTTON SWITCH: Brand: NEPTUNE E-SEP

## (undated) DEVICE — SUT VICRYL 2-0 CT-1 36 IN J945H

## (undated) DEVICE — LIGHT GLOVE GREEN

## (undated) DEVICE — SUT PDS II 2-0 CT-1 27 IN Z339H

## (undated) DEVICE — INTENDED FOR TISSUE SEPARATION, AND OTHER PROCEDURES THAT REQUIRE A SHARP SURGICAL BLADE TO PUNCTURE OR CUT.: Brand: BARD-PARKER ® SAFETYLOCK CARBON RIB-BACK BLADES

## (undated) DEVICE — STANDARD SURGICAL GOWN, L: Brand: CONVERTORS

## (undated) DEVICE — PAD GROUNDING DUAL ADULT

## (undated) DEVICE — CHEST/BREAST DRAPE: Brand: CONVERTORS

## (undated) DEVICE — TUBING SUCTION 5MM X 12 FT

## (undated) DEVICE — BASIC SINGLE BASIN-LF: Brand: MEDLINE INDUSTRIES, INC.

## (undated) DEVICE — SUT VICRYL 2-0 CT-1 27 IN J259H

## (undated) DEVICE — SUT MONOCRYL 3-0 PS-2 27 IN Y427H

## (undated) DEVICE — DECANTER: Brand: UNBRANDED

## (undated) DEVICE — SUT MONOCRYL 4-0 P-3 18 IN Y494G

## (undated) DEVICE — 1820 FOAM BLOCK NEEDLE COUNTER: Brand: DEVON

## (undated) DEVICE — ELECTRODE BLADE MOD  E-Z CLEAN 6.5IN -0014M

## (undated) DEVICE — SYRINGE 10ML LL